# Patient Record
Sex: MALE | Race: BLACK OR AFRICAN AMERICAN | NOT HISPANIC OR LATINO | URBAN - METROPOLITAN AREA
[De-identification: names, ages, dates, MRNs, and addresses within clinical notes are randomized per-mention and may not be internally consistent; named-entity substitution may affect disease eponyms.]

---

## 2023-03-20 PROBLEM — G40.909 SEIZURE DISORDER (MULTI): Status: ACTIVE | Noted: 2023-03-20

## 2023-03-20 PROBLEM — E55.9 VITAMIN D DEFICIENCY, UNSPECIFIED: Status: ACTIVE | Noted: 2023-03-20

## 2023-03-20 PROBLEM — K21.9 GERD (GASTROESOPHAGEAL REFLUX DISEASE): Status: ACTIVE | Noted: 2023-03-20

## 2023-03-20 PROBLEM — I10 HYPERTENSION: Status: ACTIVE | Noted: 2023-03-20

## 2023-03-20 PROBLEM — D64.9 ANEMIA: Status: ACTIVE | Noted: 2023-03-20

## 2023-03-20 PROBLEM — E11.9 DIABETES (MULTI): Status: ACTIVE | Noted: 2023-03-20

## 2023-03-20 PROBLEM — N52.9 ERECTILE DYSFUNCTION: Status: ACTIVE | Noted: 2023-03-20

## 2023-03-20 PROBLEM — H53.8 BLURRED VISION: Status: ACTIVE | Noted: 2023-03-20

## 2023-06-21 DIAGNOSIS — E11.9 TYPE 2 DIABETES MELLITUS WITHOUT COMPLICATIONS (MULTI): ICD-10-CM

## 2023-06-21 RX ORDER — SYRINGE-NEEDLE,INSULIN,0.5 ML 30 G X1/2"
SYRINGE, EMPTY DISPOSABLE MISCELLANEOUS
Qty: 100 EACH | Refills: 11 | Status: SHIPPED | OUTPATIENT
Start: 2023-06-21 | End: 2023-06-22 | Stop reason: SDUPTHER

## 2023-06-21 RX ORDER — FLASH GLUCOSE SENSOR
KIT MISCELLANEOUS
Qty: 1 EACH | Refills: 0 | Status: SHIPPED | OUTPATIENT
Start: 2023-06-21 | End: 2023-06-22 | Stop reason: SDUPTHER

## 2023-06-21 RX ORDER — ERGOCALCIFEROL 1.25 MG/1
1 CAPSULE ORAL
COMMUNITY
Start: 2023-05-24 | End: 2023-09-25 | Stop reason: SDUPTHER

## 2023-06-21 RX ORDER — INSULIN LISPRO 100 [IU]/ML
INJECTION, SOLUTION INTRAVENOUS; SUBCUTANEOUS
Qty: 30 ML | Refills: 2 | Status: SHIPPED | OUTPATIENT
Start: 2023-06-21 | End: 2023-06-22 | Stop reason: ALTCHOICE

## 2023-06-21 RX ORDER — INSULIN GLARGINE 100 [IU]/ML
INJECTION, SOLUTION SUBCUTANEOUS
COMMUNITY
End: 2023-06-21 | Stop reason: SDUPTHER

## 2023-06-21 RX ORDER — FLASH GLUCOSE SCANNING READER
EACH MISCELLANEOUS
COMMUNITY
Start: 2022-09-29

## 2023-06-21 RX ORDER — FLASH GLUCOSE SENSOR
KIT MISCELLANEOUS
COMMUNITY
Start: 2023-05-24 | End: 2023-06-21 | Stop reason: SDUPTHER

## 2023-06-21 RX ORDER — INSULIN ASPART 100 [IU]/ML
INJECTION, SOLUTION INTRAVENOUS; SUBCUTANEOUS
COMMUNITY
Start: 2019-08-14 | End: 2023-06-22 | Stop reason: ALTCHOICE

## 2023-06-21 RX ORDER — INSULIN LISPRO 100 [IU]/ML
8 INJECTION, SOLUTION INTRAVENOUS; SUBCUTANEOUS
COMMUNITY
Start: 2023-03-29 | End: 2023-06-22 | Stop reason: SDUPTHER

## 2023-06-21 RX ORDER — INSULIN GLARGINE 100 [IU]/ML
INJECTION, SOLUTION SUBCUTANEOUS
Qty: 3 ML | Refills: 11 | Status: SHIPPED | OUTPATIENT
Start: 2023-06-21 | End: 2023-06-22 | Stop reason: SDUPTHER

## 2023-06-21 RX ORDER — AMLODIPINE BESYLATE 10 MG/1
10 TABLET ORAL DAILY
COMMUNITY
Start: 2022-10-31 | End: 2023-06-22 | Stop reason: ALTCHOICE

## 2023-06-21 RX ORDER — PEN NEEDLE, DIABETIC 29 G X1/2"
NEEDLE, DISPOSABLE MISCELLANEOUS
COMMUNITY
Start: 2023-05-24 | End: 2023-06-21 | Stop reason: SDUPTHER

## 2023-06-22 ENCOUNTER — OFFICE VISIT (OUTPATIENT)
Dept: PRIMARY CARE | Facility: CLINIC | Age: 38
End: 2023-06-22
Payer: COMMERCIAL

## 2023-06-22 VITALS
SYSTOLIC BLOOD PRESSURE: 118 MMHG | BODY MASS INDEX: 25.18 KG/M2 | RESPIRATION RATE: 12 BRPM | HEART RATE: 101 BPM | WEIGHT: 170 LBS | HEIGHT: 69 IN | OXYGEN SATURATION: 97 % | DIASTOLIC BLOOD PRESSURE: 84 MMHG

## 2023-06-22 DIAGNOSIS — E11.69 TYPE 2 DIABETES MELLITUS WITH OTHER SPECIFIED COMPLICATION, UNSPECIFIED WHETHER LONG TERM INSULIN USE (MULTI): ICD-10-CM

## 2023-06-22 DIAGNOSIS — E55.9 VITAMIN D DEFICIENCY, UNSPECIFIED: ICD-10-CM

## 2023-06-22 DIAGNOSIS — E11.9 TYPE 2 DIABETES MELLITUS WITHOUT COMPLICATIONS (MULTI): ICD-10-CM

## 2023-06-22 DIAGNOSIS — Z00.00 ANNUAL PHYSICAL EXAM: ICD-10-CM

## 2023-06-22 DIAGNOSIS — I10 HYPERTENSION, UNSPECIFIED TYPE: Primary | ICD-10-CM

## 2023-06-22 PROBLEM — E10.9 TYPE 1 DIABETES MELLITUS WITHOUT COMPLICATIONS (MULTI): Chronic | Status: ACTIVE | Noted: 2019-09-04

## 2023-06-22 PROBLEM — E83.42 HYPOMAGNESEMIA: Status: ACTIVE | Noted: 2021-08-21

## 2023-06-22 PROBLEM — E83.39 HYPOPHOSPHATEMIA: Status: ACTIVE | Noted: 2021-08-21

## 2023-06-22 LAB — POC FINGERSTICK BLOOD GLUCOSE: 118 MG/DL (ref 70–100)

## 2023-06-22 PROCEDURE — 3079F DIAST BP 80-89 MM HG: CPT | Performed by: INTERNAL MEDICINE

## 2023-06-22 PROCEDURE — 3074F SYST BP LT 130 MM HG: CPT | Performed by: INTERNAL MEDICINE

## 2023-06-22 PROCEDURE — 1036F TOBACCO NON-USER: CPT | Performed by: INTERNAL MEDICINE

## 2023-06-22 PROCEDURE — 82962 GLUCOSE BLOOD TEST: CPT | Performed by: INTERNAL MEDICINE

## 2023-06-22 PROCEDURE — 99214 OFFICE O/P EST MOD 30 MIN: CPT | Performed by: INTERNAL MEDICINE

## 2023-06-22 RX ORDER — FLASH GLUCOSE SENSOR
KIT MISCELLANEOUS
Qty: 2 EACH | Refills: 11 | Status: SHIPPED | OUTPATIENT
Start: 2023-06-22 | End: 2023-12-07 | Stop reason: SDUPTHER

## 2023-06-22 RX ORDER — PEN NEEDLE, DIABETIC 30 GX3/16"
NEEDLE, DISPOSABLE MISCELLANEOUS
Qty: 100 EACH | Refills: 2 | Status: SHIPPED | OUTPATIENT
Start: 2023-06-22 | End: 2023-09-26 | Stop reason: SDUPTHER

## 2023-06-22 RX ORDER — SYRINGE-NEEDLE,INSULIN,0.5 ML 30 G X1/2"
SYRINGE, EMPTY DISPOSABLE MISCELLANEOUS
Qty: 100 EACH | Refills: 11 | Status: SHIPPED | OUTPATIENT
Start: 2023-06-22 | End: 2023-09-26 | Stop reason: SDUPTHER

## 2023-06-22 RX ORDER — INSULIN GLARGINE 100 [IU]/ML
12 INJECTION, SOLUTION SUBCUTANEOUS 2 TIMES DAILY
Qty: 5 EACH | Refills: 3 | Status: SHIPPED | OUTPATIENT
Start: 2023-06-22 | End: 2023-09-25 | Stop reason: SDUPTHER

## 2023-06-22 RX ORDER — ISOPROPYL ALCOHOL 70 ML/100ML
1 SWAB TOPICAL DAILY PRN
Qty: 90 EACH | Refills: 3 | Status: SHIPPED | OUTPATIENT
Start: 2023-06-22 | End: 2024-06-21

## 2023-06-22 RX ORDER — INSULIN LISPRO 100 [IU]/ML
8 INJECTION, SOLUTION INTRAVENOUS; SUBCUTANEOUS
Qty: 5 EACH | Refills: 3 | Status: SHIPPED | OUTPATIENT
Start: 2023-06-22 | End: 2023-09-25 | Stop reason: SDUPTHER

## 2023-06-22 NOTE — PROGRESS NOTES
"Subjective   Chief complaint: Armand Hirsch is a 37 y.o. male who presents for Med Refill (Pt is here for medication refills. ).    HPI:  Armand is here today for medication refills. No other concerns at this time.     Med Refill  This is a chronic problem. The current episode started more than 1 year ago. The problem occurs constantly. The problem has been unchanged. Nothing aggravates the symptoms.       Objective   /84   Pulse 101   Resp 12   Ht 1.753 m (5' 9\")   Wt 77.1 kg (170 lb)   SpO2 97%   BMI 25.10 kg/m²   Physical Exam  Vitals reviewed.   Constitutional:       Appearance: Normal appearance.   HENT:      Head: Normocephalic and atraumatic.   Cardiovascular:      Rate and Rhythm: Normal rate and regular rhythm.   Pulmonary:      Effort: Pulmonary effort is normal.      Breath sounds: Normal breath sounds.   Abdominal:      General: Bowel sounds are normal.      Palpations: Abdomen is soft.   Musculoskeletal:      Cervical back: Neck supple.   Skin:     General: Skin is warm and dry.   Neurological:      General: No focal deficit present.      Mental Status: He is alert.   Psychiatric:         Mood and Affect: Mood normal.         Behavior: Behavior is cooperative.         I have reviewed and reconciled the medication list with the patient today.   Current Outpatient Medications:     BD Ultra-Fine Mini Pen Needle 31 gauge x 3/16\" needle, USE AS DIRECTED WITH INSULIN INJECTIONS 4 TIMES PER DAY, Disp: 100 each, Rfl: 2    ergocalciferol (Vitamin D-2) 1.25 MG (93183 UT) capsule, Take 1 capsule (1,250 mcg) by mouth 1 (one) time per week., Disp: , Rfl:     FreeStyle Dennise 2 [place] misc, , Disp: , Rfl:     FreeStyle Dennise 2 Sensor kit, USE AS DIRECTED, Disp: 1 each, Rfl: 0    insulin glargine (Lantus Solostar U-100 Insulin) 100 unit/mL (3 mL) pen, INJECT 25 UNITS SUBCUTANEOUSLY EVERY MORNING. (Patient taking differently: Inject 12 Units under the skin 2 times a day. INJECT 25 UNITS SUBCUTANEOUSLY " "EVERY MORNING.), Disp: 3 mL, Rfl: 11    insulin lispro (HumaLOG) 100 unit/mL injection, Inject 8 Units under the skin 3 times a day with meals., Disp: , Rfl:     insulin syringe-needle U-100 (BD Insulin Syringe Ultra-Fine) 30G X 1/2\" 0.5 mL syringe, USE AS DIRECTED 4 TIMES DAILY, Disp: 100 each, Rfl: 11     Imaging:  No results found.     Labs reviewed:    Lab Results   Component Value Date    WBC 5.9 01/24/2023    HGB 13.6 01/24/2023    HCT 42.2 01/24/2023     01/24/2023    CHOL 141 02/21/2018    TRIG 66 02/21/2018    HDL 48.7 02/21/2018    ALT 23 11/28/2022    AST 23 11/28/2022     02/08/2023    K 4.4 02/08/2023    CL 99 02/08/2023    CREATININE 1.22 02/08/2023    BUN 16 02/08/2023    CO2 27 02/08/2023    TSH 0.90 01/07/2019    INR 0.8 (L) 01/24/2023    HGBA1C 12.1 (A) 11/28/2022       Assessment/Plan   Problem List Items Addressed This Visit          Circulatory    Hypertension - Primary     WNL (118/84)            Endocrine/Metabolic    Diabetes (CMS/[facility])     Insulin refilled today         Relevant Orders    POCT fingerstick glucose manually resulted (Completed)     Other Visit Diagnoses       Type 2 diabetes mellitus without complications (CMS/[facility])                Continue current medications as listed  Follow up in 1-2 months for CPx       "

## 2023-07-06 ENCOUNTER — APPOINTMENT (OUTPATIENT)
Dept: PRIMARY CARE | Facility: CLINIC | Age: 38
End: 2023-07-06
Payer: COMMERCIAL

## 2023-08-01 DIAGNOSIS — E55.9 VITAMIN D DEFICIENCY, UNSPECIFIED: ICD-10-CM

## 2023-08-01 DIAGNOSIS — Z00.00 ENCOUNTER FOR ANNUAL PHYSICAL EXAM: ICD-10-CM

## 2023-08-01 DIAGNOSIS — E11.9 TYPE 2 DIABETES MELLITUS WITHOUT COMPLICATION, WITHOUT LONG-TERM CURRENT USE OF INSULIN (MULTI): ICD-10-CM

## 2023-08-05 PROBLEM — R73.9 HYPERGLYCEMIA: Status: ACTIVE | Noted: 2021-11-14

## 2023-08-05 PROBLEM — E11.10 DIABETIC KETOACIDOSIS (MULTI): Status: ACTIVE | Noted: 2021-02-20

## 2023-08-05 PROBLEM — H54.7 VISION LOSS: Status: ACTIVE | Noted: 2022-10-21

## 2023-08-05 PROBLEM — N17.9 AKI (ACUTE KIDNEY INJURY) (CMS-HCC): Status: ACTIVE | Noted: 2021-09-03

## 2023-08-05 PROBLEM — E44.1 MILD PROTEIN-CALORIE MALNUTRITION (MULTI): Status: ACTIVE | Noted: 2021-02-20

## 2023-08-05 PROBLEM — E16.2 HYPOGLYCEMIA: Status: ACTIVE | Noted: 2021-11-16

## 2023-08-05 RX ORDER — AMLODIPINE BESYLATE 10 MG/1
10 TABLET ORAL DAILY
COMMUNITY
End: 2023-09-26 | Stop reason: SDUPTHER

## 2023-08-08 ENCOUNTER — LAB (OUTPATIENT)
Dept: LAB | Facility: LAB | Age: 38
End: 2023-08-08
Payer: COMMERCIAL

## 2023-08-08 DIAGNOSIS — E11.9 TYPE 2 DIABETES MELLITUS WITHOUT COMPLICATION, WITHOUT LONG-TERM CURRENT USE OF INSULIN (MULTI): ICD-10-CM

## 2023-08-08 DIAGNOSIS — Z00.00 ENCOUNTER FOR ANNUAL PHYSICAL EXAM: ICD-10-CM

## 2023-08-08 DIAGNOSIS — E55.9 VITAMIN D DEFICIENCY, UNSPECIFIED: ICD-10-CM

## 2023-08-08 LAB
ERYTHROCYTE DISTRIBUTION WIDTH (RATIO) BY AUTOMATED COUNT: 13.2 % (ref 11.5–14.5)
ERYTHROCYTE MEAN CORPUSCULAR HEMOGLOBIN CONCENTRATION (G/DL) BY AUTOMATED: 31.4 G/DL (ref 32–36)
ERYTHROCYTE MEAN CORPUSCULAR VOLUME (FL) BY AUTOMATED COUNT: 94 FL (ref 80–100)
ERYTHROCYTES (10*6/UL) IN BLOOD BY AUTOMATED COUNT: 4.37 X10E12/L (ref 4.5–5.9)
HEMATOCRIT (%) IN BLOOD BY AUTOMATED COUNT: 41.1 % (ref 41–52)
HEMOGLOBIN (G/DL) IN BLOOD: 12.9 G/DL (ref 13.5–17.5)
LEUKOCYTES (10*3/UL) IN BLOOD BY AUTOMATED COUNT: 6.8 X10E9/L (ref 4.4–11.3)
NRBC (PER 100 WBCS) BY AUTOMATED COUNT: 0 /100 WBC (ref 0–0)
PLATELETS (10*3/UL) IN BLOOD AUTOMATED COUNT: 316 X10E9/L (ref 150–450)

## 2023-08-08 PROCEDURE — 80061 LIPID PANEL: CPT

## 2023-08-08 PROCEDURE — 82306 VITAMIN D 25 HYDROXY: CPT

## 2023-08-08 PROCEDURE — 85027 COMPLETE CBC AUTOMATED: CPT

## 2023-08-08 PROCEDURE — 36415 COLL VENOUS BLD VENIPUNCTURE: CPT

## 2023-08-08 PROCEDURE — 80053 COMPREHEN METABOLIC PANEL: CPT

## 2023-08-08 PROCEDURE — 84443 ASSAY THYROID STIM HORMONE: CPT

## 2023-08-08 PROCEDURE — 83036 HEMOGLOBIN GLYCOSYLATED A1C: CPT

## 2023-08-09 LAB
ALANINE AMINOTRANSFERASE (SGPT) (U/L) IN SER/PLAS: 15 U/L (ref 10–52)
ALBUMIN (G/DL) IN SER/PLAS: 4.2 G/DL (ref 3.4–5)
ALKALINE PHOSPHATASE (U/L) IN SER/PLAS: 62 U/L (ref 33–120)
ANION GAP IN SER/PLAS: 19 MMOL/L (ref 10–20)
ASPARTATE AMINOTRANSFERASE (SGOT) (U/L) IN SER/PLAS: 16 U/L (ref 9–39)
BILIRUBIN TOTAL (MG/DL) IN SER/PLAS: 0.5 MG/DL (ref 0–1.2)
CALCIDIOL (25 OH VITAMIN D3) (NG/ML) IN SER/PLAS: 18 NG/ML
CALCIUM (MG/DL) IN SER/PLAS: 9.8 MG/DL (ref 8.6–10.6)
CARBON DIOXIDE, TOTAL (MMOL/L) IN SER/PLAS: 21 MMOL/L (ref 21–32)
CHLORIDE (MMOL/L) IN SER/PLAS: 100 MMOL/L (ref 98–107)
CHOLESTEROL (MG/DL) IN SER/PLAS: 242 MG/DL (ref 0–199)
CHOLESTEROL IN HDL (MG/DL) IN SER/PLAS: 65.3 MG/DL
CHOLESTEROL/HDL RATIO: 3.7
CREATININE (MG/DL) IN SER/PLAS: 1.12 MG/DL (ref 0.5–1.3)
ESTIMATED AVERAGE GLUCOSE FOR HBA1C: 283 MG/DL
GFR MALE: 85 ML/MIN/1.73M2
GLUCOSE (MG/DL) IN SER/PLAS: 229 MG/DL (ref 74–99)
HEMOGLOBIN A1C/HEMOGLOBIN TOTAL IN BLOOD: 11.5 %
LDL: 142 MG/DL (ref 0–99)
POTASSIUM (MMOL/L) IN SER/PLAS: 3.9 MMOL/L (ref 3.5–5.3)
PROTEIN TOTAL: 7 G/DL (ref 6.4–8.2)
SODIUM (MMOL/L) IN SER/PLAS: 136 MMOL/L (ref 136–145)
THYROTROPIN (MIU/L) IN SER/PLAS BY DETECTION LIMIT <= 0.05 MIU/L: 0.5 MIU/L (ref 0.44–3.98)
TRIGLYCERIDE (MG/DL) IN SER/PLAS: 176 MG/DL (ref 0–149)
UREA NITROGEN (MG/DL) IN SER/PLAS: 10 MG/DL (ref 6–23)
VLDL: 35 MG/DL (ref 0–40)

## 2023-09-25 ENCOUNTER — OFFICE VISIT (OUTPATIENT)
Dept: PRIMARY CARE | Facility: CLINIC | Age: 38
End: 2023-09-25
Payer: COMMERCIAL

## 2023-09-25 VITALS
OXYGEN SATURATION: 98 % | WEIGHT: 162 LBS | DIASTOLIC BLOOD PRESSURE: 82 MMHG | SYSTOLIC BLOOD PRESSURE: 118 MMHG | HEART RATE: 104 BPM | BODY MASS INDEX: 23.92 KG/M2 | RESPIRATION RATE: 12 BRPM

## 2023-09-25 DIAGNOSIS — I10 HYPERTENSION, UNSPECIFIED TYPE: Primary | ICD-10-CM

## 2023-09-25 DIAGNOSIS — E11.69 TYPE 2 DIABETES MELLITUS WITH OTHER SPECIFIED COMPLICATION, UNSPECIFIED WHETHER LONG TERM INSULIN USE (MULTI): ICD-10-CM

## 2023-09-25 DIAGNOSIS — N52.9 ERECTILE DYSFUNCTION, UNSPECIFIED ERECTILE DYSFUNCTION TYPE: ICD-10-CM

## 2023-09-25 DIAGNOSIS — Z00.00 PHYSICAL EXAM: ICD-10-CM

## 2023-09-25 DIAGNOSIS — S02.92XS: ICD-10-CM

## 2023-09-25 DIAGNOSIS — E78.5 HYPERLIPIDEMIA, UNSPECIFIED HYPERLIPIDEMIA TYPE: ICD-10-CM

## 2023-09-25 DIAGNOSIS — E11.9 TYPE 2 DIABETES MELLITUS WITHOUT COMPLICATIONS (MULTI): ICD-10-CM

## 2023-09-25 DIAGNOSIS — E55.9 VITAMIN D DEFICIENCY, UNSPECIFIED: ICD-10-CM

## 2023-09-25 DIAGNOSIS — E13.69 OTHER SPECIFIED DIABETES MELLITUS WITH OTHER SPECIFIED COMPLICATION, UNSPECIFIED WHETHER LONG TERM INSULIN USE (MULTI): ICD-10-CM

## 2023-09-25 PROBLEM — S02.92XA FACIAL FRACTURE (MULTI): Status: ACTIVE | Noted: 2019-08-27

## 2023-09-25 LAB
POC APPEARANCE, URINE: CLEAR
POC BILIRUBIN, URINE: NEGATIVE
POC BLOOD, URINE: NEGATIVE
POC COLOR, URINE: YELLOW
POC FINGERSTICK BLOOD GLUCOSE: 562 MG/DL (ref 70–100)
POC GLUCOSE, URINE: ABNORMAL MG/DL
POC KETONES, URINE: ABNORMAL MG/DL
POC LEUKOCYTES, URINE: NEGATIVE
POC NITRITE,URINE: NEGATIVE
POC OCCULT BLOOD STOOL #1: NEGATIVE
POC OCCULT BLOOD STOOL #2: NEGATIVE
POC OCCULT BLOOD STOOL #3: NEGATIVE
POC PH, URINE: 6 PH
POC PROTEIN, URINE: NEGATIVE MG/DL
POC SPECIFIC GRAVITY, URINE: 1.01
POC UROBILINOGEN, URINE: 0.2 EU/DL

## 2023-09-25 PROCEDURE — 1036F TOBACCO NON-USER: CPT | Performed by: INTERNAL MEDICINE

## 2023-09-25 PROCEDURE — 99214 OFFICE O/P EST MOD 30 MIN: CPT | Performed by: INTERNAL MEDICINE

## 2023-09-25 PROCEDURE — 82270 OCCULT BLOOD FECES: CPT | Performed by: INTERNAL MEDICINE

## 2023-09-25 PROCEDURE — 3046F HEMOGLOBIN A1C LEVEL >9.0%: CPT | Performed by: INTERNAL MEDICINE

## 2023-09-25 PROCEDURE — 82962 GLUCOSE BLOOD TEST: CPT | Performed by: INTERNAL MEDICINE

## 2023-09-25 PROCEDURE — 81002 URINALYSIS NONAUTO W/O SCOPE: CPT | Performed by: INTERNAL MEDICINE

## 2023-09-25 PROCEDURE — 3079F DIAST BP 80-89 MM HG: CPT | Performed by: INTERNAL MEDICINE

## 2023-09-25 PROCEDURE — 99395 PREV VISIT EST AGE 18-39: CPT | Performed by: INTERNAL MEDICINE

## 2023-09-25 PROCEDURE — 93000 ELECTROCARDIOGRAM COMPLETE: CPT | Performed by: INTERNAL MEDICINE

## 2023-09-25 PROCEDURE — 3074F SYST BP LT 130 MM HG: CPT | Performed by: INTERNAL MEDICINE

## 2023-09-25 RX ORDER — INSULIN GLARGINE 100 [IU]/ML
18 INJECTION, SOLUTION SUBCUTANEOUS 2 TIMES DAILY
Qty: 5 EACH | Refills: 3 | Status: SHIPPED | OUTPATIENT
Start: 2023-09-25 | End: 2023-09-26 | Stop reason: SDUPTHER

## 2023-09-25 RX ORDER — INSULIN LISPRO 100 [IU]/ML
8 INJECTION, SOLUTION INTRAVENOUS; SUBCUTANEOUS
Qty: 5 EACH | Refills: 3 | Status: SHIPPED | OUTPATIENT
Start: 2023-09-25 | End: 2023-09-26 | Stop reason: SDUPTHER

## 2023-09-25 RX ORDER — SILDENAFIL 50 MG/1
50 TABLET, FILM COATED ORAL DAILY PRN
Qty: 12 TABLET | Refills: 11 | Status: SHIPPED | OUTPATIENT
Start: 2023-09-25 | End: 2023-09-26 | Stop reason: SDUPTHER

## 2023-09-25 RX ORDER — TIZANIDINE 4 MG/1
4 TABLET ORAL NIGHTLY
Qty: 30 TABLET | Refills: 11 | Status: SHIPPED | OUTPATIENT
Start: 2023-09-25 | End: 2023-10-18

## 2023-09-25 RX ORDER — ERGOCALCIFEROL 1.25 MG/1
1 CAPSULE ORAL
Qty: 12 CAPSULE | Refills: 3 | Status: SHIPPED | OUTPATIENT
Start: 2023-09-25

## 2023-09-25 RX ORDER — ATORVASTATIN CALCIUM 20 MG/1
20 TABLET, FILM COATED ORAL DAILY
Qty: 90 TABLET | Refills: 3 | Status: SHIPPED | OUTPATIENT
Start: 2023-09-25

## 2023-09-25 ASSESSMENT — PAIN SCALES - GENERAL: PAINLEVEL: 4

## 2023-09-25 NOTE — PROGRESS NOTES
Subjective   Chief complaint: Armand Hirsch is a 37 y.o. male who presents for Annual Exam (Pt. Is here today for his annual exam.).    HPI:  HPI  Patient is here for his annual wellness visit regarding diabetes.    Objective   /82   Pulse 104   Resp 12   Wt 73.5 kg (162 lb)   SpO2 98%   BMI 23.92 kg/m²   Physical Exam  Vitals reviewed.   Constitutional:       Appearance: Normal appearance.   HENT:      Head: Normocephalic.      Mouth/Throat:      Mouth: Mucous membranes are moist.      Pharynx: Oropharynx is clear.   Eyes:      Extraocular Movements: Extraocular movements intact.   Cardiovascular:      Rate and Rhythm: Normal rate.      Pulses: Normal pulses.   Pulmonary:      Effort: Pulmonary effort is normal.      Breath sounds: Normal breath sounds.   Abdominal:      General: Abdomen is flat. Bowel sounds are normal. There is no distension.      Palpations: Abdomen is soft. There is no mass.   Musculoskeletal:      Cervical back: Neck supple.   Skin:     General: Skin is warm and dry.      Capillary Refill: Capillary refill takes less than 2 seconds.   Neurological:      Mental Status: He is oriented to person, place, and time.   Psychiatric:         Mood and Affect: Mood normal.         Behavior: Behavior normal.         I have reviewed and reconciled the medication list with the patient today.   Current Outpatient Medications:     alcohol swabs pads, medicated, Apply 1 each topically once daily as needed (Use when injecting Insulin and testing blood sugar)., Disp: 90 each, Rfl: 3    amLODIPine (Norvasc) 10 mg tablet, Take 1 tablet (10 mg) by mouth once daily., Disp: , Rfl:     ergocalciferol (Vitamin D-2) 1.25 MG (82457 UT) capsule, Take 1 capsule (1,250 mcg) by mouth 1 (one) time per week., Disp: , Rfl:     FreeStyle Dennise 2 [place] misc, , Disp: , Rfl:     FreeStyle Dennise sensor system (FreeStyle Dennise 2 Sensor) kit, USE AS DIRECTED, Disp: 2 each, Rfl: 11    insulin glargine (Lantus Solostar  "U-100 Insulin) 100 unit/mL (3 mL) pen, Inject 12 Units under the skin 2 times a day. INJECT 25 UNITS SUBCUTANEOUSLY EVERY MORNING., Disp: 5 each, Rfl: 3    insulin lispro (HumaLOG) 100 unit/mL injection, Inject 8 Units under the skin 3 times a day with meals., Disp: 5 each, Rfl: 3    insulin syringe-needle U-100 (BD Insulin Syringe Ultra-Fine) 30G X 1/2\" 0.5 mL syringe, USE AS DIRECTED 4 TIMES DAILY, Disp: 100 each, Rfl: 11    pen needle, diabetic (BD Ultra-Fine Mini Pen Needle) 31 gauge x 3/16\" needle, Inject 3 times daily, Disp: 100 each, Rfl: 2     Imaging:  No results found.     Labs reviewed:    Lab Results   Component Value Date    WBC 6.8 08/08/2023    HGB 12.9 (L) 08/08/2023    HCT 41.1 08/08/2023     08/08/2023    CHOL 242 (H) 08/08/2023    TRIG 176 (H) 08/08/2023    HDL 65.3 08/08/2023    ALT 15 08/08/2023    AST 16 08/08/2023     08/08/2023    K 3.9 08/08/2023     08/08/2023    CREATININE 1.12 08/08/2023    BUN 10 08/08/2023    CO2 21 08/08/2023    TSH 0.50 08/08/2023    INR 0.8 (L) 01/24/2023    HGBA1C 11.5 (A) 08/08/2023       Assessment/Plan   Problem List Items Addressed This Visit       Diabetes (CMS/[facility])     Labs reviewed with patient, Hgb A1c is 11.5%. He says his fasting blood sugar is usually in the 200s. Currenlty on Humalog 6 units +sliding scale TID, and Lantus 14units BID.     Changing insulin regimen to: Humalog 8units with each meal TID, and Lantus 18units twice a day.          Relevant Orders    POCT fingerstick glucose manually resulted (Completed)    Erectile dysfunction     Will send in Rx for Sildenafil 50mg to take 1 hour prior to sexual intercourse.         Hypertension - Primary     BP is well controlled 118/82. Continue amlodipine 10mg         Vitamin D deficiency, unspecified     Labs reviewed with patient, vitamin D is low at 18. Will refill vitamin D weekly supplements.         Facial fracture (CMS/[facility])     Other Visit Diagnoses       Physical exam        " Relevant Orders    ECG 12 Lead    POCT occult blood stool manually resulted (Completed)    POCT UA (nonautomated) manually resulted (Completed)    Hyperlipidemia, unspecified hyperlipidemia type        Type 2 diabetes mellitus without complications (CMS/HCC)                Continue current medications as listed  Follow up in 3 months

## 2023-09-25 NOTE — ASSESSMENT & PLAN NOTE
Labs reviewed with patient, Hgb A1c is 11.5%. He says his fasting blood sugar is usually in the 200s. Currenlty on Humalog 6 units +sliding scale TID, and Lantus 14units BID.     Changing insulin regimen to: Humalog 8units with each meal TID, and Lantus 18units twice a day.

## 2023-09-26 DIAGNOSIS — E11.9 TYPE 2 DIABETES MELLITUS WITHOUT COMPLICATIONS (MULTI): ICD-10-CM

## 2023-09-26 DIAGNOSIS — N52.9 ERECTILE DYSFUNCTION, UNSPECIFIED ERECTILE DYSFUNCTION TYPE: ICD-10-CM

## 2023-09-26 DIAGNOSIS — I10 HYPERTENSION, UNSPECIFIED TYPE: ICD-10-CM

## 2023-09-26 DIAGNOSIS — E11.69 TYPE 2 DIABETES MELLITUS WITH OTHER SPECIFIED COMPLICATION, UNSPECIFIED WHETHER LONG TERM INSULIN USE (MULTI): ICD-10-CM

## 2023-09-26 DIAGNOSIS — R25.2 LEG CRAMPS: ICD-10-CM

## 2023-09-26 RX ORDER — INSULIN GLARGINE 100 [IU]/ML
18 INJECTION, SOLUTION SUBCUTANEOUS 2 TIMES DAILY
Qty: 5 EACH | Refills: 3 | Status: SHIPPED | OUTPATIENT
Start: 2023-09-26 | End: 2023-09-26 | Stop reason: SDUPTHER

## 2023-09-26 RX ORDER — SYRINGE-NEEDLE,INSULIN,0.5 ML 30 G X1/2"
SYRINGE, EMPTY DISPOSABLE MISCELLANEOUS
Qty: 100 EACH | Refills: 11 | Status: SHIPPED | OUTPATIENT
Start: 2023-09-26 | End: 2024-03-11 | Stop reason: SDUPTHER

## 2023-09-26 RX ORDER — VITS A,C,E/LUTEIN/MINERALS 300MCG-200
800 TABLET ORAL DAILY
Qty: 90 TABLET | Refills: 3 | Status: SHIPPED | OUTPATIENT
Start: 2023-09-26 | End: 2024-05-14

## 2023-09-26 RX ORDER — AMLODIPINE BESYLATE 10 MG/1
10 TABLET ORAL DAILY
Qty: 90 TABLET | Refills: 3 | Status: SHIPPED | OUTPATIENT
Start: 2023-09-26

## 2023-09-26 RX ORDER — PEN NEEDLE, DIABETIC 30 GX3/16"
NEEDLE, DISPOSABLE MISCELLANEOUS
Qty: 100 EACH | Refills: 2 | Status: SHIPPED | OUTPATIENT
Start: 2023-09-26 | End: 2023-12-29

## 2023-09-26 RX ORDER — SILDENAFIL 50 MG/1
50 TABLET, FILM COATED ORAL DAILY PRN
Qty: 12 TABLET | Refills: 11 | Status: SHIPPED | OUTPATIENT
Start: 2023-09-26

## 2023-09-26 RX ORDER — INSULIN LISPRO 100 [IU]/ML
8 INJECTION, SOLUTION INTRAVENOUS; SUBCUTANEOUS
Qty: 5 EACH | Refills: 3 | Status: SHIPPED | OUTPATIENT
Start: 2023-09-26 | End: 2023-12-11 | Stop reason: SDUPTHER

## 2023-09-27 RX ORDER — INSULIN GLARGINE 100 [IU]/ML
18 INJECTION, SOLUTION SUBCUTANEOUS 2 TIMES DAILY
Qty: 5 EACH | Refills: 3 | Status: SHIPPED | OUTPATIENT
Start: 2023-09-27 | End: 2024-01-31 | Stop reason: SDUPTHER

## 2023-10-18 DIAGNOSIS — S02.92XS: ICD-10-CM

## 2023-10-18 RX ORDER — TIZANIDINE 4 MG/1
4 TABLET ORAL NIGHTLY
Qty: 90 TABLET | Refills: 4 | Status: SHIPPED | OUTPATIENT
Start: 2023-10-18 | End: 2023-12-29 | Stop reason: ALTCHOICE

## 2023-10-19 ENCOUNTER — OFFICE VISIT (OUTPATIENT)
Dept: PRIMARY CARE | Facility: CLINIC | Age: 38
End: 2023-10-19
Payer: COMMERCIAL

## 2023-10-19 VITALS
SYSTOLIC BLOOD PRESSURE: 146 MMHG | DIASTOLIC BLOOD PRESSURE: 87 MMHG | BODY MASS INDEX: 24.81 KG/M2 | HEART RATE: 119 BPM | OXYGEN SATURATION: 97 % | WEIGHT: 168 LBS | RESPIRATION RATE: 12 BRPM

## 2023-10-19 DIAGNOSIS — E11.69 TYPE 2 DIABETES MELLITUS WITH OTHER SPECIFIED COMPLICATION, UNSPECIFIED WHETHER LONG TERM INSULIN USE (MULTI): Primary | ICD-10-CM

## 2023-10-19 DIAGNOSIS — Z23 FLU VACCINE NEED: ICD-10-CM

## 2023-10-19 DIAGNOSIS — E11.41 DIABETIC MONONEUROPATHY ASSOCIATED WITH TYPE 2 DIABETES MELLITUS (MULTI): ICD-10-CM

## 2023-10-19 LAB — POC FINGERSTICK BLOOD GLUCOSE: 174 MG/DL (ref 70–100)

## 2023-10-19 PROCEDURE — 3046F HEMOGLOBIN A1C LEVEL >9.0%: CPT | Performed by: INTERNAL MEDICINE

## 2023-10-19 PROCEDURE — 90686 IIV4 VACC NO PRSV 0.5 ML IM: CPT | Performed by: INTERNAL MEDICINE

## 2023-10-19 PROCEDURE — 82962 GLUCOSE BLOOD TEST: CPT | Performed by: INTERNAL MEDICINE

## 2023-10-19 PROCEDURE — 1036F TOBACCO NON-USER: CPT | Performed by: INTERNAL MEDICINE

## 2023-10-19 PROCEDURE — 90471 IMMUNIZATION ADMIN: CPT | Performed by: INTERNAL MEDICINE

## 2023-10-19 PROCEDURE — 3077F SYST BP >= 140 MM HG: CPT | Performed by: INTERNAL MEDICINE

## 2023-10-19 PROCEDURE — 99214 OFFICE O/P EST MOD 30 MIN: CPT | Performed by: INTERNAL MEDICINE

## 2023-10-19 PROCEDURE — 3079F DIAST BP 80-89 MM HG: CPT | Performed by: INTERNAL MEDICINE

## 2023-10-19 RX ORDER — GABAPENTIN 100 MG/1
100 CAPSULE ORAL 3 TIMES DAILY
Qty: 90 CAPSULE | Refills: 0 | Status: SHIPPED | OUTPATIENT
Start: 2023-10-19 | End: 2023-10-24

## 2023-10-19 NOTE — PROGRESS NOTES
Subjective   Chief complaint: Armand Hirsch is a 37 y.o. male who presents for Pain (Pt c/o pain in both feet mainly at night when laying down. ).    HPI:  Pt presents for bilateral foot pain onset 2 months ago following a hospitalization for DKA. Pt was seen few weeks ago here for same complaint and was prescribed tizanidine. He notes no alleviation to his symptoms.        Objective   /87   Pulse (!) 119   Resp 12   Wt 76.2 kg (168 lb)   SpO2 97%   BMI 24.81 kg/m²   Physical Exam  Vitals reviewed.   Constitutional:       Appearance: Normal appearance.   Cardiovascular:      Rate and Rhythm: Normal rate and regular rhythm.      Pulses: Normal pulses.      Heart sounds: Normal heart sounds.   Pulmonary:      Effort: Pulmonary effort is normal.      Breath sounds: Normal breath sounds.   Musculoskeletal:         General: No swelling, tenderness, deformity or signs of injury. Normal range of motion.   Neurological:      Mental Status: He is alert and oriented to person, place, and time.   Psychiatric:         Mood and Affect: Mood normal.         I have reviewed and reconciled the medication list with the patient today.   Current Outpatient Medications:     alcohol swabs pads, medicated, Apply 1 each topically once daily as needed (Use when injecting Insulin and testing blood sugar)., Disp: 90 each, Rfl: 3    amLODIPine (Norvasc) 10 mg tablet, Take 1 tablet (10 mg) by mouth once daily., Disp: 90 tablet, Rfl: 3    atorvastatin (Lipitor) 20 mg tablet, Take 1 tablet (20 mg) by mouth once daily., Disp: 90 tablet, Rfl: 3    ergocalciferol (Vitamin D-2) 1.25 MG (08544 UT) capsule, Take 1 capsule (1,250 mcg) by mouth 1 (one) time per week., Disp: 12 capsule, Rfl: 3    FreeStyle Dennise 2 [place] misc, , Disp: , Rfl:     FreeStyle Dennise sensor system (FreeStyle Dennise 2 Sensor) kit, USE AS DIRECTED, Disp: 2 each, Rfl: 11    insulin glargine (Lantus Solostar U-100 Insulin) 100 unit/mL (3 mL) pen, Inject 18 Units under  "the skin 2 times a day. INJECT 25 UNITS SUBCUTANEOUSLY EVERY MORNING. (Patient taking differently: Inject 18 Units under the skin 2 times a day. Inject 18 units twice daily), Disp: 5 each, Rfl: 3    insulin lispro (HumaLOG) 100 unit/mL injection, Inject 8 Units under the skin 3 times a day with meals., Disp: 5 each, Rfl: 3    insulin syringe-needle U-100 (BD Insulin Syringe Ultra-Fine) 30G X 1/2\" 0.5 mL syringe, USE AS DIRECTED 4 TIMES DAILY, Disp: 100 each, Rfl: 11    magnesium oxide (Mag-Ox) 200 mg magnesium tablet, Take 4 tablets (800 mg) by mouth once daily., Disp: 90 tablet, Rfl: 3    pen needle, diabetic (BD Ultra-Fine Mini Pen Needle) 31 gauge x 3/16\" needle, Inject 3 times daily, Disp: 100 each, Rfl: 2    sildenafil (Viagra) 50 mg tablet, Take 1 tablet (50 mg) by mouth once daily as needed for erectile dysfunction., Disp: 12 tablet, Rfl: 11    tiZANidine (Zanaflex) 4 mg tablet, TAKE 1 TABLET (4 MG) BY MOUTH ONCE DAILY AT BEDTIME., Disp: 90 tablet, Rfl: 4     Imaging:  ECG 12 Lead    Result Date: 9/25/2023  Nonspecific ST Tchanges       Labs reviewed:    Lab Results   Component Value Date    WBC 6.8 08/08/2023    HGB 12.9 (L) 08/08/2023    HCT 41.1 08/08/2023     08/08/2023    CHOL 242 (H) 08/08/2023    TRIG 176 (H) 08/08/2023    HDL 65.3 08/08/2023    ALT 15 08/08/2023    AST 16 08/08/2023     08/08/2023    K 3.9 08/08/2023     08/08/2023    CREATININE 1.12 08/08/2023    BUN 10 08/08/2023    CO2 21 08/08/2023    TSH 0.50 08/08/2023    INR 0.8 (L) 01/24/2023    HGBA1C 11.5 (A) 08/08/2023       Assessment/Plan   Problem List Items Addressed This Visit       Diabetes (CMS/[facility])    Relevant Orders    POCT fingerstick glucose manually resulted (Completed)     Other Visit Diagnoses       Flu vaccine need        Relevant Orders    Flu vaccine (IIV4) age 6 months and greater, preservative free (Completed)            Continue current medications as listed  Follow up in 4 wks reassessment of foot " pain after taking gabapentin.

## 2023-10-21 DIAGNOSIS — E11.41 DIABETIC MONONEUROPATHY ASSOCIATED WITH TYPE 2 DIABETES MELLITUS (MULTI): ICD-10-CM

## 2023-10-24 RX ORDER — GABAPENTIN 100 MG/1
100 CAPSULE ORAL 3 TIMES DAILY
Qty: 90 CAPSULE | Refills: 0 | Status: SHIPPED | OUTPATIENT
Start: 2023-10-24 | End: 2023-11-27 | Stop reason: SDUPTHER

## 2023-11-20 ENCOUNTER — APPOINTMENT (OUTPATIENT)
Dept: PRIMARY CARE | Facility: CLINIC | Age: 38
End: 2023-11-20
Payer: COMMERCIAL

## 2023-11-27 ENCOUNTER — OFFICE VISIT (OUTPATIENT)
Dept: PRIMARY CARE | Facility: CLINIC | Age: 38
End: 2023-11-27
Payer: COMMERCIAL

## 2023-11-27 VITALS
HEART RATE: 100 BPM | DIASTOLIC BLOOD PRESSURE: 85 MMHG | WEIGHT: 175 LBS | OXYGEN SATURATION: 98 % | SYSTOLIC BLOOD PRESSURE: 120 MMHG | BODY MASS INDEX: 25.84 KG/M2 | RESPIRATION RATE: 12 BRPM

## 2023-11-27 DIAGNOSIS — E11.41 DIABETIC MONONEUROPATHY ASSOCIATED WITH TYPE 2 DIABETES MELLITUS (MULTI): ICD-10-CM

## 2023-11-27 DIAGNOSIS — E11.69 TYPE 2 DIABETES MELLITUS WITH OTHER SPECIFIED COMPLICATION, UNSPECIFIED WHETHER LONG TERM INSULIN USE (MULTI): ICD-10-CM

## 2023-11-27 LAB — POC FINGERSTICK BLOOD GLUCOSE: 142 MG/DL (ref 70–100)

## 2023-11-27 PROCEDURE — 82962 GLUCOSE BLOOD TEST: CPT | Performed by: INTERNAL MEDICINE

## 2023-11-27 PROCEDURE — 3074F SYST BP LT 130 MM HG: CPT | Performed by: INTERNAL MEDICINE

## 2023-11-27 PROCEDURE — 1036F TOBACCO NON-USER: CPT | Performed by: INTERNAL MEDICINE

## 2023-11-27 PROCEDURE — 3046F HEMOGLOBIN A1C LEVEL >9.0%: CPT | Performed by: INTERNAL MEDICINE

## 2023-11-27 PROCEDURE — 3079F DIAST BP 80-89 MM HG: CPT | Performed by: INTERNAL MEDICINE

## 2023-11-27 PROCEDURE — 99214 OFFICE O/P EST MOD 30 MIN: CPT | Performed by: INTERNAL MEDICINE

## 2023-11-27 RX ORDER — GABAPENTIN 100 MG/1
200 CAPSULE ORAL 3 TIMES DAILY
Qty: 180 CAPSULE | Refills: 2 | Status: SHIPPED | OUTPATIENT
Start: 2023-11-27 | End: 2023-12-07

## 2023-11-27 NOTE — PROGRESS NOTES
Subjective   Chief complaint: Armand Hirsch is a 38 y.o. male who presents for Foot Pain (Pt is being seen for bilateral foot pain. ).    HPI:  Follow-up pain in both feet not getting better with the gabapentin 103 times a day        Objective   /85   Pulse 100   Resp 12   Wt 79.4 kg (175 lb)   SpO2 98%   BMI 25.84 kg/m²   Physical Exam  Vitals reviewed.   Constitutional:       Appearance: Normal appearance.   HENT:      Head: Normocephalic and atraumatic.   Cardiovascular:      Rate and Rhythm: Normal rate and regular rhythm.   Pulmonary:      Effort: Pulmonary effort is normal.      Breath sounds: Normal breath sounds.   Abdominal:      General: Bowel sounds are normal.      Palpations: Abdomen is soft.   Musculoskeletal:      Cervical back: Neck supple.   Skin:     General: Skin is warm and dry.   Neurological:      General: No focal deficit present.      Mental Status: He is alert.   Psychiatric:         Mood and Affect: Mood normal.         Behavior: Behavior is cooperative.         I have reviewed and reconciled the medication list with the patient today.   Current Outpatient Medications:     amLODIPine (Norvasc) 10 mg tablet, Take 1 tablet (10 mg) by mouth once daily., Disp: 90 tablet, Rfl: 3    atorvastatin (Lipitor) 20 mg tablet, Take 1 tablet (20 mg) by mouth once daily., Disp: 90 tablet, Rfl: 3    ergocalciferol (Vitamin D-2) 1.25 MG (21839 UT) capsule, Take 1 capsule (1,250 mcg) by mouth 1 (one) time per week., Disp: 12 capsule, Rfl: 3    FreeStyle Dennise 2 [place] misc, , Disp: , Rfl:     FreeStyle Dennise sensor system (FreeStyle Dennise 2 Sensor) kit, USE AS DIRECTED, Disp: 2 each, Rfl: 11    gabapentin (Neurontin) 100 mg capsule, TAKE 1 CAPSULE BY MOUTH THREE TIMES A DAY, Disp: 90 capsule, Rfl: 0    insulin glargine (Lantus Solostar U-100 Insulin) 100 unit/mL (3 mL) pen, Inject 18 Units under the skin 2 times a day. INJECT 25 UNITS SUBCUTANEOUSLY EVERY MORNING. (Patient taking differently:  "Inject 18 Units under the skin 2 times a day. Inject 18 units twice daily), Disp: 5 each, Rfl: 3    insulin lispro (HumaLOG) 100 unit/mL injection, Inject 8 Units under the skin 3 times a day with meals., Disp: 5 each, Rfl: 3    insulin syringe-needle U-100 (BD Insulin Syringe Ultra-Fine) 30G X 1/2\" 0.5 mL syringe, USE AS DIRECTED 4 TIMES DAILY, Disp: 100 each, Rfl: 11    magnesium oxide (Mag-Ox) 200 mg magnesium tablet, Take 4 tablets (800 mg) by mouth once daily., Disp: 90 tablet, Rfl: 3    pen needle, diabetic (BD Ultra-Fine Mini Pen Needle) 31 gauge x 3/16\" needle, Inject 3 times daily, Disp: 100 each, Rfl: 2    sildenafil (Viagra) 50 mg tablet, Take 1 tablet (50 mg) by mouth once daily as needed for erectile dysfunction., Disp: 12 tablet, Rfl: 11    tiZANidine (Zanaflex) 4 mg tablet, TAKE 1 TABLET (4 MG) BY MOUTH ONCE DAILY AT BEDTIME., Disp: 90 tablet, Rfl: 4    alcohol swabs pads, medicated, Apply 1 each topically once daily as needed (Use when injecting Insulin and testing blood sugar)., Disp: 90 each, Rfl: 3     Imaging:  No results found.     Labs reviewed:    Lab Results   Component Value Date    WBC 6.8 08/08/2023    HGB 12.9 (L) 08/08/2023    HCT 41.1 08/08/2023     08/08/2023    CHOL 242 (H) 08/08/2023    TRIG 176 (H) 08/08/2023    HDL 65.3 08/08/2023    ALT 15 08/08/2023    AST 16 08/08/2023     08/08/2023    K 3.9 08/08/2023     08/08/2023    CREATININE 1.12 08/08/2023    BUN 10 08/08/2023    CO2 21 08/08/2023    TSH 0.50 08/08/2023    INR 0.8 (L) 01/24/2023    HGBA1C 11.5 (A) 08/08/2023       Assessment/Plan   Problem List Items Addressed This Visit       Diabetes (CMS/[facility])    Relevant Orders    POCT fingerstick glucose manually resulted (Completed)    Diabetic mononeuropathy associated with type 2 diabetes mellitus (CMS/HCC)       Continue current medications as listed  Follow up in 4 weeks     "

## 2023-12-06 DIAGNOSIS — E11.41 DIABETIC MONONEUROPATHY ASSOCIATED WITH TYPE 2 DIABETES MELLITUS (MULTI): ICD-10-CM

## 2023-12-06 DIAGNOSIS — E11.9 TYPE 2 DIABETES MELLITUS WITHOUT COMPLICATIONS (MULTI): ICD-10-CM

## 2023-12-07 RX ORDER — FLASH GLUCOSE SENSOR
KIT MISCELLANEOUS
Qty: 2 EACH | Refills: 11 | Status: SHIPPED | OUTPATIENT
Start: 2023-12-07 | End: 2023-12-11 | Stop reason: SDUPTHER

## 2023-12-07 RX ORDER — GABAPENTIN 100 MG/1
100 CAPSULE ORAL 3 TIMES DAILY
Qty: 90 CAPSULE | Refills: 2 | Status: SHIPPED | OUTPATIENT
Start: 2023-12-07 | End: 2024-02-13

## 2023-12-11 DIAGNOSIS — E03.9 HYPOTHYROIDISM, UNSPECIFIED TYPE: ICD-10-CM

## 2023-12-11 DIAGNOSIS — I10 PRIMARY HYPERTENSION: ICD-10-CM

## 2023-12-11 DIAGNOSIS — E11.69 TYPE 2 DIABETES MELLITUS WITH OTHER SPECIFIED COMPLICATION, UNSPECIFIED WHETHER LONG TERM INSULIN USE (MULTI): ICD-10-CM

## 2023-12-11 DIAGNOSIS — E11.9 TYPE 2 DIABETES MELLITUS WITHOUT COMPLICATIONS (MULTI): ICD-10-CM

## 2023-12-11 DIAGNOSIS — N17.9 AKI (ACUTE KIDNEY INJURY) (CMS-HCC): ICD-10-CM

## 2023-12-11 DIAGNOSIS — I10 BENIGN ESSENTIAL HYPERTENSION: ICD-10-CM

## 2023-12-11 DIAGNOSIS — D50.9 IRON DEFICIENCY ANEMIA, UNSPECIFIED IRON DEFICIENCY ANEMIA TYPE: ICD-10-CM

## 2023-12-11 DIAGNOSIS — E78.00 ELEVATED LDL CHOLESTEROL LEVEL: ICD-10-CM

## 2023-12-11 RX ORDER — FLASH GLUCOSE SENSOR
KIT MISCELLANEOUS
Qty: 6 EACH | Refills: 11 | Status: SHIPPED | OUTPATIENT
Start: 2023-12-11

## 2023-12-11 RX ORDER — INSULIN LISPRO 100 [IU]/ML
15 INJECTION, SOLUTION INTRAVENOUS; SUBCUTANEOUS
Qty: 10 EACH | Refills: 3 | Status: SHIPPED | OUTPATIENT
Start: 2023-12-11 | End: 2024-01-31 | Stop reason: SDUPTHER

## 2023-12-22 ENCOUNTER — APPOINTMENT (OUTPATIENT)
Dept: PRIMARY CARE | Facility: CLINIC | Age: 38
End: 2023-12-22
Payer: COMMERCIAL

## 2023-12-22 ENCOUNTER — LAB (OUTPATIENT)
Dept: LAB | Facility: LAB | Age: 38
End: 2023-12-22
Payer: COMMERCIAL

## 2023-12-22 DIAGNOSIS — Z00.00 ANNUAL PHYSICAL EXAM: ICD-10-CM

## 2023-12-22 DIAGNOSIS — E55.9 VITAMIN D DEFICIENCY, UNSPECIFIED: ICD-10-CM

## 2023-12-22 DIAGNOSIS — N17.9 AKI (ACUTE KIDNEY INJURY) (CMS-HCC): ICD-10-CM

## 2023-12-22 DIAGNOSIS — E11.69 TYPE 2 DIABETES MELLITUS WITH OTHER SPECIFIED COMPLICATION, UNSPECIFIED WHETHER LONG TERM INSULIN USE (MULTI): ICD-10-CM

## 2023-12-22 DIAGNOSIS — D50.9 IRON DEFICIENCY ANEMIA, UNSPECIFIED IRON DEFICIENCY ANEMIA TYPE: ICD-10-CM

## 2023-12-22 DIAGNOSIS — I10 PRIMARY HYPERTENSION: ICD-10-CM

## 2023-12-22 LAB
25(OH)D3 SERPL-MCNC: 28 NG/ML (ref 30–100)
ALBUMIN SERPL BCP-MCNC: 4.2 G/DL (ref 3.4–5)
ALP SERPL-CCNC: 61 U/L (ref 33–120)
ALT SERPL W P-5'-P-CCNC: 15 U/L (ref 10–52)
ANION GAP SERPL CALC-SCNC: 15 MMOL/L (ref 10–20)
AST SERPL W P-5'-P-CCNC: 16 U/L (ref 9–39)
BILIRUB SERPL-MCNC: 0.5 MG/DL (ref 0–1.2)
BUN SERPL-MCNC: 11 MG/DL (ref 6–23)
CALCIUM SERPL-MCNC: 9.7 MG/DL (ref 8.6–10.6)
CHLORIDE SERPL-SCNC: 95 MMOL/L (ref 98–107)
CHOLEST SERPL-MCNC: 201 MG/DL (ref 0–199)
CHOLESTEROL/HDL RATIO: 3.2
CO2 SERPL-SCNC: 23 MMOL/L (ref 21–32)
CREAT SERPL-MCNC: 1.13 MG/DL (ref 0.5–1.3)
ERYTHROCYTE [DISTWIDTH] IN BLOOD BY AUTOMATED COUNT: 12.5 % (ref 11.5–14.5)
EST. AVERAGE GLUCOSE BLD GHB EST-MCNC: 226 MG/DL
GFR SERPL CREATININE-BSD FRML MDRD: 84 ML/MIN/1.73M*2
GLUCOSE SERPL-MCNC: 283 MG/DL (ref 74–99)
HBA1C MFR BLD: 9.5 %
HCT VFR BLD AUTO: 40.8 % (ref 41–52)
HDLC SERPL-MCNC: 62.1 MG/DL
HGB BLD-MCNC: 14 G/DL (ref 13.5–17.5)
LDLC SERPL CALC-MCNC: 114 MG/DL
MCH RBC QN AUTO: 28.6 PG (ref 26–34)
MCHC RBC AUTO-ENTMCNC: 34.3 G/DL (ref 32–36)
MCV RBC AUTO: 83 FL (ref 80–100)
NON HDL CHOLESTEROL: 139 MG/DL (ref 0–149)
NRBC BLD-RTO: 0 /100 WBCS (ref 0–0)
PLATELET # BLD AUTO: 238 X10*3/UL (ref 150–450)
POTASSIUM SERPL-SCNC: 4.5 MMOL/L (ref 3.5–5.3)
PROT SERPL-MCNC: 7.2 G/DL (ref 6.4–8.2)
PSA SERPL-MCNC: 0.65 NG/ML
RBC # BLD AUTO: 4.89 X10*6/UL (ref 4.5–5.9)
SODIUM SERPL-SCNC: 128 MMOL/L (ref 136–145)
TRIGL SERPL-MCNC: 124 MG/DL (ref 0–149)
TSH SERPL-ACNC: 1.37 MIU/L (ref 0.44–3.98)
VLDL: 25 MG/DL (ref 0–40)
WBC # BLD AUTO: 6.3 X10*3/UL (ref 4.4–11.3)

## 2023-12-22 PROCEDURE — 36415 COLL VENOUS BLD VENIPUNCTURE: CPT

## 2023-12-22 PROCEDURE — 80061 LIPID PANEL: CPT

## 2023-12-22 PROCEDURE — 82306 VITAMIN D 25 HYDROXY: CPT

## 2023-12-22 PROCEDURE — 84443 ASSAY THYROID STIM HORMONE: CPT

## 2023-12-22 PROCEDURE — 85027 COMPLETE CBC AUTOMATED: CPT

## 2023-12-22 PROCEDURE — 84153 ASSAY OF PSA TOTAL: CPT

## 2023-12-22 PROCEDURE — 80053 COMPREHEN METABOLIC PANEL: CPT

## 2023-12-22 PROCEDURE — 83036 HEMOGLOBIN GLYCOSYLATED A1C: CPT

## 2023-12-27 DIAGNOSIS — E11.69 TYPE 2 DIABETES MELLITUS WITH OTHER SPECIFIED COMPLICATION, UNSPECIFIED WHETHER LONG TERM INSULIN USE (MULTI): ICD-10-CM

## 2023-12-29 ENCOUNTER — APPOINTMENT (OUTPATIENT)
Dept: PRIMARY CARE | Facility: CLINIC | Age: 38
End: 2023-12-29
Payer: COMMERCIAL

## 2023-12-29 ENCOUNTER — OFFICE VISIT (OUTPATIENT)
Dept: PRIMARY CARE | Facility: CLINIC | Age: 38
End: 2023-12-29
Payer: COMMERCIAL

## 2023-12-29 VITALS
WEIGHT: 169 LBS | DIASTOLIC BLOOD PRESSURE: 77 MMHG | HEART RATE: 120 BPM | RESPIRATION RATE: 12 BRPM | BODY MASS INDEX: 24.96 KG/M2 | OXYGEN SATURATION: 98 % | SYSTOLIC BLOOD PRESSURE: 114 MMHG

## 2023-12-29 DIAGNOSIS — E55.9 VITAMIN D DEFICIENCY, UNSPECIFIED: ICD-10-CM

## 2023-12-29 DIAGNOSIS — I10 BENIGN ESSENTIAL HYPERTENSION: ICD-10-CM

## 2023-12-29 DIAGNOSIS — E11.69 TYPE 2 DIABETES MELLITUS WITH OTHER SPECIFIED COMPLICATION, UNSPECIFIED WHETHER LONG TERM INSULIN USE (MULTI): ICD-10-CM

## 2023-12-29 DIAGNOSIS — D50.9 IRON DEFICIENCY ANEMIA, UNSPECIFIED IRON DEFICIENCY ANEMIA TYPE: ICD-10-CM

## 2023-12-29 DIAGNOSIS — E78.00 ELEVATED LDL CHOLESTEROL LEVEL: ICD-10-CM

## 2023-12-29 DIAGNOSIS — E78.2 MIXED HYPERLIPIDEMIA: ICD-10-CM

## 2023-12-29 DIAGNOSIS — N17.9 AKI (ACUTE KIDNEY INJURY) (CMS-HCC): ICD-10-CM

## 2023-12-29 DIAGNOSIS — E03.9 HYPOTHYROIDISM, UNSPECIFIED TYPE: ICD-10-CM

## 2023-12-29 DIAGNOSIS — E11.41 DIABETIC MONONEUROPATHY ASSOCIATED WITH TYPE 2 DIABETES MELLITUS (MULTI): Primary | ICD-10-CM

## 2023-12-29 DIAGNOSIS — I10 PRIMARY HYPERTENSION: ICD-10-CM

## 2023-12-29 PROBLEM — R59.0 HILAR LYMPHADENOPATHY: Status: ACTIVE | Noted: 2023-02-13

## 2023-12-29 PROBLEM — D86.0 PULMONARY SARCOIDOSIS (MULTI): Status: ACTIVE | Noted: 2023-12-29

## 2023-12-29 PROBLEM — H46.9 OPTIC NEURITIS: Status: ACTIVE | Noted: 2023-02-10

## 2023-12-29 PROBLEM — E78.5 HYPERLIPIDEMIA: Status: ACTIVE | Noted: 2023-12-29

## 2023-12-29 LAB — POC FINGERSTICK BLOOD GLUCOSE: 67 MG/DL (ref 70–100)

## 2023-12-29 PROCEDURE — 3078F DIAST BP <80 MM HG: CPT | Performed by: INTERNAL MEDICINE

## 2023-12-29 PROCEDURE — 99214 OFFICE O/P EST MOD 30 MIN: CPT | Performed by: INTERNAL MEDICINE

## 2023-12-29 PROCEDURE — 3049F LDL-C 100-129 MG/DL: CPT | Performed by: INTERNAL MEDICINE

## 2023-12-29 PROCEDURE — 3046F HEMOGLOBIN A1C LEVEL >9.0%: CPT | Performed by: INTERNAL MEDICINE

## 2023-12-29 PROCEDURE — 1036F TOBACCO NON-USER: CPT | Performed by: INTERNAL MEDICINE

## 2023-12-29 PROCEDURE — 82962 GLUCOSE BLOOD TEST: CPT | Performed by: INTERNAL MEDICINE

## 2023-12-29 PROCEDURE — 3074F SYST BP LT 130 MM HG: CPT | Performed by: INTERNAL MEDICINE

## 2023-12-29 RX ORDER — PEN NEEDLE, DIABETIC 30 GX3/16"
NEEDLE, DISPOSABLE MISCELLANEOUS
Qty: 200 EACH | Refills: 2 | Status: SHIPPED | OUTPATIENT
Start: 2023-12-29 | End: 2024-05-14 | Stop reason: SDUPTHER

## 2023-12-31 NOTE — PROGRESS NOTES
Subjective   Chief complaint: Armand Hirsch is a 38 y.o. male who presents for Follow-up (Pt is here for blood work follow up, pt c/o medication given is not helping the pain in his feet. ).    HPI:  Follow-up neuropathy lower extremity the patienis not improving with gabapentin 203 times a day.  Follow-up diabetes mellitus patient is an insulin noncompliant with his insulin dosages in his diet.  Follow-up vitamin D deficiency  Follow-up increase lipid.  Follow-up anemia mild        Objective   /77   Pulse (!) 120   Resp 12   Wt 76.7 kg (169 lb)   SpO2 98%   BMI 24.96 kg/m²   Physical Exam  Vitals reviewed.   Constitutional:       Appearance: Normal appearance.   HENT:      Head: Normocephalic and atraumatic.   Cardiovascular:      Rate and Rhythm: Normal rate and regular rhythm.   Pulmonary:      Effort: Pulmonary effort is normal.      Breath sounds: Normal breath sounds.   Abdominal:      General: Bowel sounds are normal.      Palpations: Abdomen is soft.   Musculoskeletal:      Cervical back: Neck supple.   Skin:     General: Skin is warm and dry.   Neurological:      General: No focal deficit present.      Mental Status: He is alert.   Psychiatric:         Mood and Affect: Mood normal.         Behavior: Behavior is cooperative.         I have reviewed and reconciled the medication list with the patient today.   Current Outpatient Medications:     alcohol swabs pads, medicated, Apply 1 each topically once daily as needed (Use when injecting Insulin and testing blood sugar)., Disp: 90 each, Rfl: 3    amLODIPine (Norvasc) 10 mg tablet, Take 1 tablet (10 mg) by mouth once daily., Disp: 90 tablet, Rfl: 3    atorvastatin (Lipitor) 20 mg tablet, Take 1 tablet (20 mg) by mouth once daily., Disp: 90 tablet, Rfl: 3    ergocalciferol (Vitamin D-2) 1.25 MG (99206 UT) capsule, Take 1 capsule (1,250 mcg) by mouth 1 (one) time per week., Disp: 12 capsule, Rfl: 3    FreeStyle Dennise 2 [place] misc, , Disp: , Rfl:      "FreeStyle Dennise sensor system (FreeStyle Dennise 2 Sensor) kit, USE AS DIRECTED, Disp: 6 each, Rfl: 11    gabapentin (Neurontin) 100 mg capsule, TAKE 1 CAPSULE BY MOUTH THREE TIMES A DAY, Disp: 90 capsule, Rfl: 2    insulin glargine (Lantus Solostar U-100 Insulin) 100 unit/mL (3 mL) pen, Inject 18 Units under the skin 2 times a day. INJECT 25 UNITS SUBCUTANEOUSLY EVERY MORNING. (Patient taking differently: Inject 18 Units under the skin 2 times a day. Inject 18 units twice daily), Disp: 5 each, Rfl: 3    insulin lispro (HumaLOG) 100 unit/mL injection, Inject 15 Units under the skin 3 times a day with meals., Disp: 10 each, Rfl: 3    insulin syringe-needle U-100 (BD Insulin Syringe Ultra-Fine) 30G X 1/2\" 0.5 mL syringe, USE AS DIRECTED 4 TIMES DAILY, Disp: 100 each, Rfl: 11    magnesium oxide (Mag-Ox) 200 mg magnesium tablet, Take 4 tablets (800 mg) by mouth once daily., Disp: 90 tablet, Rfl: 3    pen needle, diabetic (BD Ultra-Fine Mini Pen Needle) 31 gauge x 3/16\" needle, INJECT 3 TIMES DAILY, Disp: 200 each, Rfl: 2    sildenafil (Viagra) 50 mg tablet, Take 1 tablet (50 mg) by mouth once daily as needed for erectile dysfunction., Disp: 12 tablet, Rfl: 11     Imaging:  CT ABD/PEL W IVCON    Result Date: 12/4/2023  * * *Final Report* * * DATE OF EXAM: Dec  4 2023  5:37AM   [facility]   0530  -  CT ABD/PEL W IVCON  / ACCESSION #  [id number] PROCEDURE REASON: Nausea/vomiting      * * * * Physician Interpretation * * * * RESULT: EXAMINATION:  CT ABDOMEN AND PELVIS WITH IV CONTRAST CLINICAL HISTORY: Abdominal pain, myalgias and bilateral flank pain. TECHNIQUE: CT of the abdomen and pelvis was performed using standard technique, scanning from just above the dome of the diaphragm to the symphysis pubis. MQ:  CTAP_3 Contrast: IV:  95 ml of Omnipaque 300 CT Radiation dose: Integrated Dose-length product (DLP) for this visit =   340 mGy*cm. CT Dose Reduction Employed: Automated exposure control (AEC) COMPARISON: None. RESULT: Liver: No " mass. Biliary: No bile duct dilation. Spleen: No mass. No splenomegaly. Pancreas: No mass or duct dilation. Adrenals: No mass. Kidneys: No mass, calculus or hydronephrosis. GI tract: No dilation or wall thickening. Normal appendix.   Lymph nodes: No abdominal or pelvic lymphadenopathy. Mesentery/Peritoneum: No ascites or mass. Retroperitoneum: No mass. Vasculature: Unremarkable. Pelvis: No mass, ascites or fluid collection. Bones/Soft Tissues: Subcutaneous injection sites are seen in the anterior abdominal wall. Lower thorax: Unremarkable.  (topogram) images: No additional findings.    IMPRESSION: No acute findings. Transcribed Using Voice Recognition Transcribe Date/Time: Dec  4 2023  5:38A Dictated by: SONIA SIMPSON MD This examination was interpreted and the report reviewed and electronically signed by: SONIA SIMPSON MD on Dec  4 2023  6:01AM  EST    XR CHEST 1V FRONTAL PORT    Result Date: 12/4/2023  * * *Final Report* * * DATE OF EXAM: Dec  4 2023  2:47AM   SPX   [id number]  -  XR CHEST 1V FRONTAL PORT  / ACCESSION #  [id number] PROCEDURE REASON: Shortness of breath      * * * * Physician Interpretation * * * * RESULT: EXAMINATION:  CHEST RADIOGRAPH (PORTABLE SINGLE VIEW AP) Exam Date/Time:  12/4/2023 2:47 AM CLINICAL HISTORY: Shortness of breath MQ:  XCPR_5 Comparison:  07/24/2023 RESULT: Lines, tubes, and devices:  None. Lungs and pleura:  Clear without pleural effusion or pneumothorax. Cardiomediastinal silhouette:  Unremarkable cardiomediastinal silhouette. Other:  .    IMPRESSION: Negative chest. Transcribed Using Voice Recognition Transcribe Date/Time: Dec  4 2023  2:47A Dictated by: LILLIAN POWELL MD This examination was interpreted and the report reviewed and electronically signed by: LILLIAN POWELL MD on Dec  4 2023  2:58AM  EST       Labs reviewed:    Lab Results   Component Value Date    WBC 6.3 12/22/2023    HGB 14.0 12/22/2023    HCT 40.8 (L) 12/22/2023     12/22/2023    CHOL 201 (H) 12/22/2023     TRIG 124 12/22/2023    HDL 62.1 12/22/2023    ALT 15 12/22/2023    AST 16 12/22/2023     (L) 12/22/2023    K 4.5 12/22/2023    CL 95 (L) 12/22/2023    CREATININE 1.13 12/22/2023    BUN 11 12/22/2023    CO2 23 12/22/2023    TSH 1.37 12/22/2023    INR 0.8 (L) 01/24/2023    HGBA1C 9.5 (H) 12/22/2023       Assessment/Plan   Problem List Items Addressed This Visit       Anemia    Diabetes (CMS/[facility])    Relevant Orders    POCT fingerstick glucose manually resulted (Completed)    Hemoglobin A1C    Hypertension    Relevant Orders    Comprehensive Metabolic Panel    Lipid Panel    FATOUMATA (acute kidney injury) (CMS/[facility])    Relevant Orders    Comprehensive Metabolic Panel    Lipid Panel    Hemoglobin A1C     Other Visit Diagnoses       Hypothyroidism, unspecified type        Benign essential hypertension        Elevated LDL cholesterol level                Continue current medications as listed  Follow up in 3 months check hemoglobin A1c CMP CBC

## 2023-12-31 NOTE — ASSESSMENT & PLAN NOTE
Continue insulin using the Lantus and the scale.  Patient is using lispro 8 units before each meal in addition to the scale also he will continue using the Lantus twice a day will check hemoglobin A1c in 3 months.  
Follow low-fat diet  Flaxseed 1200 mcg 3 times a day.  
Increase gabapentin to 300  3 times a day times a day    
Labs reviewed with patient, vitamin D is low at 18. Will refill vitamin D weekly supplements.  
Very mild we will monitor.  
Other (Free Text): Irritated Seborrheic Keratosis: Counseling. Reassurance. **NO ABN WILL BE SIGNED, BUT PATIENT IS AWARE THESE LESION WILL BE SUBMITTED TO THEIR INSURANCE AND STILL COULD GET A POTENTIAL KICK BACK IF RENDERED NOT NECESSARY**\\n \\n Patient is aware lesions are benign. \\n Patient is aware lesions have the potential to come back. \\n Patient are aware that lesions may fall off in 2-3 weeks. \\n Patient is aware that removal is by LN2. \\n Patient is aware that lesions may look worse before looking better (pink, puffy, scab, may or may not blister and peel). \\n Patient is aware they can use OTC Hydrocortisone 1% ointment to help with the itch and irritation. \\n Patient is aware to not pick or irritate lesions. \\n Patient is aware that lesions should fall off on their own. \\n Patient is aware scaring can happen after lesions have fallen off. \\n Patient is aware sun protection (30-50 SPF+) everyday to help prevent hyperpigmentation and hypopigmentation on areas removed. \\n Patient is aware that lesions will be submitted to insurance. \\n Patient was given LN2 handout. ; Follow up in 1 year.
Note Text (......Xxx Chief Complaint.): This diagnosis correlates with the
Detail Level: Zone

## 2024-01-02 ENCOUNTER — APPOINTMENT (OUTPATIENT)
Dept: PRIMARY CARE | Facility: CLINIC | Age: 39
End: 2024-01-02
Payer: COMMERCIAL

## 2024-01-31 DIAGNOSIS — E11.9 TYPE 2 DIABETES MELLITUS WITHOUT COMPLICATIONS (MULTI): ICD-10-CM

## 2024-01-31 DIAGNOSIS — E11.69 TYPE 2 DIABETES MELLITUS WITH OTHER SPECIFIED COMPLICATION, UNSPECIFIED WHETHER LONG TERM INSULIN USE (MULTI): ICD-10-CM

## 2024-01-31 RX ORDER — INSULIN GLARGINE 100 [IU]/ML
18 INJECTION, SOLUTION SUBCUTANEOUS 2 TIMES DAILY
Qty: 5 EACH | Refills: 3 | Status: SHIPPED | OUTPATIENT
Start: 2024-01-31 | End: 2024-03-11 | Stop reason: ALTCHOICE

## 2024-01-31 RX ORDER — INSULIN LISPRO 100 [IU]/ML
18 INJECTION, SOLUTION INTRAVENOUS; SUBCUTANEOUS
Qty: 10 EACH | Refills: 3 | Status: SHIPPED | OUTPATIENT
Start: 2024-01-31

## 2024-02-08 ENCOUNTER — PATIENT OUTREACH (OUTPATIENT)
Dept: CARE COORDINATION | Facility: CLINIC | Age: 39
End: 2024-02-08
Payer: COMMERCIAL

## 2024-02-08 ENCOUNTER — DOCUMENTATION (OUTPATIENT)
Dept: PRIMARY CARE | Facility: CLINIC | Age: 39
End: 2024-02-08
Payer: COMMERCIAL

## 2024-02-08 NOTE — PROGRESS NOTES
Discharge Facility:[facility]  Discharge Diagnosis:Hyperkalemia , DKA  Admission Date:02/04/24  Discharge Date: 02/07/24    PCP Appointment Date:02/20/24  Specialist Appointment Date:   Hospital Encounter and Summary: Linked   See discharge assessment below for further details  Engagement  Call Start Time: 0102 (2/8/2024  1:02 PM)    Medications  Medications reviewed with patient/caregiver?: Yes (2/8/2024  1:02 PM)  Is the patient having any side effects they believe may be caused by any medication additions or changes?: No (2/8/2024  1:02 PM)  Does the patient have all medications ordered at discharge?: No (having issues with the script that was written and will address this with the Dr) (2/8/2024  1:02 PM)  Is the patient taking all medications as directed (includes completed medication regime)?: Yes (2/8/2024  1:02 PM)    Appointments  Does the patient have a primary care provider?: Yes (2/8/2024  1:02 PM)  Care Management Interventions: Verified appointment date/time/provider (2/8/2024  1:02 PM)    Self Management  Has home health visited the patient within 72 hours of discharge?: No (2/8/2024  1:02 PM)  Has all Durable Medical Equipment (DME) been delivered?: No (2/8/2024  1:02 PM)    Patient Teaching  Does the patient have access to their discharge instructions?: Yes (2/8/2024  1:02 PM)  Care Management Interventions: Reviewed instructions with patient (2/8/2024  1:02 PM)  What is the patient's perception of their health status since discharge?: Improving (2/8/2024  1:02 PM)    Wrap Up  Call End Time: 0115 (2/8/2024  1:02 PM)

## 2024-02-12 DIAGNOSIS — E11.41 DIABETIC MONONEUROPATHY ASSOCIATED WITH TYPE 2 DIABETES MELLITUS (MULTI): ICD-10-CM

## 2024-02-13 RX ORDER — GABAPENTIN 100 MG/1
100 CAPSULE ORAL 3 TIMES DAILY
Qty: 90 CAPSULE | Refills: 2 | Status: SHIPPED | OUTPATIENT
Start: 2024-02-13

## 2024-02-19 ENCOUNTER — DOCUMENTATION (OUTPATIENT)
Dept: PRIMARY CARE | Facility: CLINIC | Age: 39
End: 2024-02-19
Payer: COMMERCIAL

## 2024-02-28 PROCEDURE — 99222 1ST HOSP IP/OBS MODERATE 55: CPT | Performed by: INTERNAL MEDICINE

## 2024-02-29 PROCEDURE — 99232 SBSQ HOSP IP/OBS MODERATE 35: CPT | Performed by: INTERNAL MEDICINE

## 2024-03-01 PROCEDURE — 99232 SBSQ HOSP IP/OBS MODERATE 35: CPT | Performed by: INTERNAL MEDICINE

## 2024-03-02 PROCEDURE — 99232 SBSQ HOSP IP/OBS MODERATE 35: CPT | Performed by: INTERNAL MEDICINE

## 2024-03-03 PROCEDURE — 99232 SBSQ HOSP IP/OBS MODERATE 35: CPT | Performed by: INTERNAL MEDICINE

## 2024-03-04 PROCEDURE — 99238 HOSP IP/OBS DSCHRG MGMT 30/<: CPT | Performed by: INTERNAL MEDICINE

## 2024-03-05 ENCOUNTER — PATIENT OUTREACH (OUTPATIENT)
Dept: CARE COORDINATION | Facility: CLINIC | Age: 39
End: 2024-03-05
Payer: COMMERCIAL

## 2024-03-05 NOTE — PROGRESS NOTES
Discharge Facility:[facility]   Discharge Diagnosis:DKA  Admission Date:02/27/24  Discharge Date: 03/04/24    PCP Appointment Date:03/11/24  Specialist Appointment Date:   Hospital Encounter and Summary: Linked   See discharge assessment below for further details  /Engagement  Call Start Time: 0900 (3/5/2024  9:00 AM)    Medications  Medications reviewed with patient/caregiver?: Yes (no new meds) (3/5/2024  9:00 AM)  Is the patient having any side effects they believe may be caused by any medication additions or changes?: No (3/5/2024  9:00 AM)  Does the patient have all medications ordered at discharge?: Not applicable (3/5/2024  9:00 AM)  Is the patient taking all medications as directed (includes completed medication regime)?: Yes (3/5/2024  9:00 AM)    Appointments  Does the patient have a primary care provider?: Yes (3/5/2024  9:00 AM)  Care Management Interventions: Verified appointment date/time/provider (3/5/2024  9:00 AM)  Care Management Interventions: Advised patient to keep appointment (3/5/2024  9:00 AM)    Self Management  Has home health visited the patient within 72 hours of discharge?: Not applicable (3/5/2024  9:00 AM)  Has all Durable Medical Equipment (DME) been delivered?: No (3/5/2024  9:00 AM)    Patient Teaching  Does the patient have access to their discharge instructions?: Yes (3/5/2024  9:00 AM)  Care Management Interventions: Reviewed instructions with patient (3/5/2024  9:00 AM)  What is the patient's perception of their health status since discharge?: Improving (3/5/2024  9:00 AM)    Wrap Up  Call End Time: 0915 (3/5/2024  9:00 AM)

## 2024-03-11 ENCOUNTER — OFFICE VISIT (OUTPATIENT)
Dept: PRIMARY CARE | Facility: CLINIC | Age: 39
End: 2024-03-11
Payer: COMMERCIAL

## 2024-03-11 VITALS
DIASTOLIC BLOOD PRESSURE: 68 MMHG | SYSTOLIC BLOOD PRESSURE: 99 MMHG | OXYGEN SATURATION: 98 % | WEIGHT: 168 LBS | BODY MASS INDEX: 24.81 KG/M2 | RESPIRATION RATE: 12 BRPM | HEART RATE: 105 BPM

## 2024-03-11 DIAGNOSIS — E11.9 TYPE 2 DIABETES MELLITUS WITHOUT COMPLICATIONS (MULTI): ICD-10-CM

## 2024-03-11 DIAGNOSIS — E10.65 POORLY CONTROLLED TYPE 1 DIABETES MELLITUS (MULTI): Primary | ICD-10-CM

## 2024-03-11 PROBLEM — E03.9 HYPOTHYROIDISM: Status: ACTIVE | Noted: 2024-03-11

## 2024-03-11 PROBLEM — R41.0 CONFUSION: Status: ACTIVE | Noted: 2024-02-20

## 2024-03-11 PROBLEM — E87.5 HYPERKALEMIA: Status: ACTIVE | Noted: 2024-02-18

## 2024-03-11 PROBLEM — D50.9 IRON DEFICIENCY ANEMIA: Status: ACTIVE | Noted: 2023-03-20

## 2024-03-11 LAB — POC FINGERSTICK BLOOD GLUCOSE: 281 MG/DL (ref 70–100)

## 2024-03-11 PROCEDURE — 99214 OFFICE O/P EST MOD 30 MIN: CPT | Performed by: INTERNAL MEDICINE

## 2024-03-11 PROCEDURE — 3078F DIAST BP <80 MM HG: CPT | Performed by: INTERNAL MEDICINE

## 2024-03-11 PROCEDURE — 82962 GLUCOSE BLOOD TEST: CPT | Performed by: INTERNAL MEDICINE

## 2024-03-11 PROCEDURE — 1036F TOBACCO NON-USER: CPT | Performed by: INTERNAL MEDICINE

## 2024-03-11 PROCEDURE — 3074F SYST BP LT 130 MM HG: CPT | Performed by: INTERNAL MEDICINE

## 2024-03-11 RX ORDER — INSULIN HUMAN 100 [IU]/ML
INJECTION, SUSPENSION SUBCUTANEOUS
COMMUNITY
End: 2024-04-03

## 2024-03-11 RX ORDER — SYRINGE-NEEDLE,INSULIN,0.5 ML 30 G X1/2"
SYRINGE, EMPTY DISPOSABLE MISCELLANEOUS
Qty: 100 EACH | Refills: 11 | Status: SHIPPED | OUTPATIENT
Start: 2024-03-11

## 2024-03-11 RX ORDER — SODIUM CHLORIDE 1 G/1
2 TABLET ORAL 2 TIMES DAILY
COMMUNITY
Start: 2024-02-24 | End: 2024-03-25

## 2024-03-11 NOTE — ASSESSMENT & PLAN NOTE
Patient was taking NPH 70/30 insulin 30 units with breakfast and 15 units before bed.   Advised him to take 15 units with dinner.   DC Lantus.  Continue regimen from hospital discharge.   Will recheck A1C in one month.   Insulin syringes refilled.

## 2024-03-11 NOTE — PROGRESS NOTES
Subjective   Chief complaint: Armand Hirsch is a 38 y.o. male who presents for Hospital Follow-up (PT is here for hospital follow up for loss of consciousness ).    HPI:  HPI  Patient was admitted to [facility] from 2/27/24-3/4/24 for DKA. His adjusted insulin regimen since discharge is:   NPH 70/30 insulin: 30-0-15-0  Supplemental sliding scale: Regular #1 AC/#1 HS  Accucheck TID.     Since DC from hospital, he reports his family has been assisting him with his medications and diet. He states his blood sugar in the mornings have been into the 50's. Otherwise no complaints.     Objective   BP 99/68   Pulse 105   Resp 12   Wt 76.2 kg (168 lb)   SpO2 98%   BMI 24.81 kg/m²   Physical Exam  Constitutional:       General: He is not in acute distress.     Appearance: Normal appearance. He is not diaphoretic.   HENT:      Head: Normocephalic and atraumatic.      Mouth/Throat:      Mouth: Mucous membranes are moist.      Pharynx: Oropharynx is clear. No posterior oropharyngeal erythema.   Cardiovascular:      Rate and Rhythm: Regular rhythm. Tachycardia present.      Pulses: Normal pulses.      Heart sounds: Normal heart sounds. No murmur heard.  Pulmonary:      Effort: Pulmonary effort is normal. No respiratory distress.      Breath sounds: Normal breath sounds. No wheezing, rhonchi or rales.   Abdominal:      General: Bowel sounds are normal.      Palpations: Abdomen is soft.      Tenderness: There is no abdominal tenderness.   Musculoskeletal:         General: Normal range of motion.      Right lower leg: No edema.      Left lower leg: No edema.   Skin:     General: Skin is warm and dry.      Capillary Refill: Capillary refill takes less than 2 seconds.   Neurological:      General: No focal deficit present.      Mental Status: He is alert and oriented to person, place, and time. Mental status is at baseline.      Cranial Nerves: No cranial nerve deficit.      Sensory: No sensory deficit.      Motor: No  "weakness.      Gait: Gait normal.      Comments: A&O x 3, to person, place, and time. CN II-XII intact.          I have reviewed and reconciled the medication list with the patient today.   Current Outpatient Medications:     amLODIPine (Norvasc) 10 mg tablet, Take 1 tablet (10 mg) by mouth once daily., Disp: 90 tablet, Rfl: 3    atorvastatin (Lipitor) 20 mg tablet, Take 1 tablet (20 mg) by mouth once daily., Disp: 90 tablet, Rfl: 3    sodium chloride 1,000 mg tablet, Take 2 tablets (2 g) by mouth twice a day., Disp: , Rfl:     alcohol swabs pads, medicated, Apply 1 each topically once daily as needed (Use when injecting Insulin and testing blood sugar)., Disp: 90 each, Rfl: 3    ergocalciferol (Vitamin D-2) 1.25 MG (99685 UT) capsule, Take 1 capsule (1,250 mcg) by mouth 1 (one) time per week., Disp: 12 capsule, Rfl: 3    FreeStyle Dennise 2 [place] misc, , Disp: , Rfl:     FreeStyle Dennise sensor system (FreeStyle Dennise 2 Sensor) kit, USE AS DIRECTED, Disp: 6 each, Rfl: 11    gabapentin (Neurontin) 100 mg capsule, TAKE 1 CAPSULE BY MOUTH THREE TIMES A DAY, Disp: 90 capsule, Rfl: 2    HumuLIN 70/30 U-100 Insulin 100 unit/mL (70-30) injection, INJECT 300UNITS DIALY WITH BREAKFAST AND INJECT 15 UNITS DAILY WITH DINNER, Disp: , Rfl:     insulin lispro (HumaLOG) 100 unit/mL injection, Inject 18 Units under the skin 3 times a day with meals., Disp: 10 each, Rfl: 3    insulin syringe-needle U-100 (BD Insulin Syringe Ultra-Fine) 30G X 1/2\" 0.5 mL syringe, USE AS DIRECTED 4 TIMES DAILY, Disp: 100 each, Rfl: 11    magnesium oxide (Mag-Ox) 200 mg magnesium tablet, Take 4 tablets (800 mg) by mouth once daily., Disp: 90 tablet, Rfl: 3    pen needle, diabetic (BD Ultra-Fine Mini Pen Needle) 31 gauge x 3/16\" needle, INJECT 3 TIMES DAILY, Disp: 200 each, Rfl: 2    sildenafil (Viagra) 50 mg tablet, Take 1 tablet (50 mg) by mouth once daily as needed for erectile dysfunction., Disp: 12 tablet, Rfl: 11     Imaging:  XR chest 1 " view    Result Date: 2/29/2024  * * *Final Report* * * DATE OF EXAM: Feb 28 2024  6:17PM   MMX   [id number]  -  XR CHEST 1V FRONTAL PORT  / ACCESSION #  [id number] PROCEDURE REASON: Cough      * * * * Physician Interpretation * * * *  EXAMINATION:  CHEST RADIOGRAPH (PORTABLE SINGLE VIEW AP) Exam Date/Time:  2/28/2024 6:17 PM CLINICAL HISTORY: Cough MQ:  XCPR_5 Comparison:  02/27/2024 RESULT: Lines, tubes, and devices:  None. Lungs and pleura:  Crowded lung markings in the presence of hypoinflation.  Curvilinear infrahilar opacities.  No pneumothorax.   Costophrenic angles are clear. Cardiomediastinal silhouette:  Normal cardiomediastinal silhouette. Other:  .    IMPRESSION: Suspect bibasilar atelectasis in the presence of hypoinflation. : [facility]OILA   Transcribe Date/Time: Feb 29 2024  7:44A Dictated by : LUIS M CRESPO MD This examination was interpreted and the report reviewed and electronically signed by: LUIS M CRESPO MD on Feb 29 2024  7:45AM  EST    XR chest 1 view    Result Date: 2/27/2024  * * *Final Report* * * DATE OF EXAM: Feb 27 2024  3:25PM   MMX   [id number]  -  XR CHEST 1V FRONTAL PORT  / ACCESSION #  [id number] PROCEDURE REASON: Shortness of breath      * * * * Physician Interpretation * * * *  EXAMINATION:  CHEST RADIOGRAPH (PORTABLE SINGLE VIEW AP) Exam Date/Time:  2/27/2024 3:25 PM Clinical History:  Shortness of breath Comparison:  02/18/2024 RESULT: LINES, TUBES, AND DEVICES:  None. CARDIOMEDIASTINAL SILHOUETTE:  The cardiac and mediastinal silhouettes appear unremarkable. LUNGS AND PLEURA: No consolidation.  No pleural effusion. No pulmonary vascular congestion. No pneumothorax identified. OTHER:  N/A    IMPRESSION: No acute cardiopulmonary process. : [facility]   Transcribe Date/Time: Feb 27 2024  3:29P Dictated by : JONNIE OROZCO MD This examination was interpreted and the report reviewed and electronically signed by: JONNIE OROZCO MD on Feb 27 2024  3:30PM  EST    XR chest 1  view    Result Date: 2/19/2024  * * *Final Report* * * DATE OF EXAM: Feb 18 2024 12:22PM   SPX   [id number]  -  XR CHEST 1V FRONTAL PORT  / ACCESSION #  [id number] PROCEDURE REASON: Shortness of breath      * * * * Physician Interpretation * * * * RESULT: EXAMINATION:  CHEST RADIOGRAPH (PORTABLE SINGLE VIEW AP) Exam Date/Time:  2/18/2024 12:22 PM CLINICAL HISTORY: Shortness of breath MQ:  XCPR_5 Comparison:  2/4/2024 RESULT: There has been a relatively shallow inspiration.  The heart is normal in size.  There is no darin pulmonary consolidation, pleural effusion, pneumothorax, or pulmonary vascular redistribution.    IMPRESSION: Relatively shallow inspiration.  No acute pulmonary process is identified. Transcribed Using Voice Recognition Transcribe Date/Time: Feb 19 2024  7:46A Dictated by: ROSA HERNÁNDEZ MD This examination was interpreted and the report reviewed and electronically signed by: ROSA HERNÁNDEZ MD on Feb 19 2024  7:47AM  EST    CT head wo IV contrast    Result Date: 2/18/2024  * * *Final Report* * * DATE OF EXAM: Feb 18 2024 10:13AM   SPC   [id number]  -  CT BRAIN WO IVCON  / ACCESSION #  [id number] PROCEDURE REASON: Mental status change, unknown cause      * * * * Physician Interpretation * * * * RESULT: EXAMINATION:  CT BRAIN WO IVCON CLINICAL HISTORY:  Mental status change, unknown cause  Mental status change TECHNIQUE:  Serial axial images without IV contrast were obtained from the vertex to the foramen magnum. MQ:  CTBWO_3 CT Dose-Length Product (DLP): 822  mGy*cm CT Dose Reduction Employed: No dose reduction techniques were required COMPARISON:  CT scan from 2/4/2024. MRI of the brain from 10/22/2022 RESULT: Post-operative change:  None. Acute change:   No evidence of an acute infarct or other acute parenchymal process. Hemorrhage:    No evidence of acute intracranial hemorrhage. ECASS hemorrhagic transformation score: Not Applicable Mass effect / Mass lesion:   There is no evidence of an intracranial mass  or extraaxial fluid collection.  No significant mass effect. Chronic change:   Small area of low density suggesting compatible with a small remote LEFT parietal infarct also seen on prior MRI is again seen an additional minimal areas of low attenuation in the supratentorial white matter are grossly similar to the prior imaging. Parenchyma:  There is no significant volume loss. Ventricles:   The ventricles are within normal limits of size and configuration for age. Paranasal sinuses and skull base:  The visualized paranasal sinuses are grossly clear.  (topogram) images: No additional findings. -    IMPRESSION: 1.  No acute intracranial findings. 2. 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 Transcribed Using Voice Recognition Transcribe Date/Time: Feb 18 2024 10:17A Dictated by: SERA RICHADRSON MD This examination was interpreted and the report reviewed and electronically signed by: SERA RICHARDSON MD on Feb 18 2024 10:20AM  EST       Labs reviewed:    Lab Results   Component Value Date    WBC 6.3 12/22/2023    HGB 14.0 12/22/2023    HCT 40.8 (L) 12/22/2023     12/22/2023    CHOL 201 (H) 12/22/2023    TRIG 124 12/22/2023    HDL 62.1 12/22/2023    ALT 15 12/22/2023    AST 16 12/22/2023     (L) 12/22/2023    K 4.5 12/22/2023    CL 95 (L) 12/22/2023    CREATININE 1.13 12/22/2023    BUN 11 12/22/2023    CO2 23 12/22/2023    TSH 1.37 12/22/2023    INR 0.8 (L) 01/24/2023    HGBA1C 11.4 (H) 02/19/2024       Assessment/Plan   Problem List Items Addressed This Visit          Endocrine/Metabolic    Poorly controlled type 1 diabetes mellitus (CMS/HCC) - Primary     Patient was taking NPH 70/30 insulin 30 units with breakfast and 15 units before bed.   Advised him to take 15 units with dinner.   Continue regimen from hospital discharge.   Will recheck A1C in one month.   Insulin syringes refilled.          Relevant Orders    POCT fingerstick glucose manually  resulted (Completed)     Other Visit Diagnoses       Type 2 diabetes mellitus without complications (CMS/[facility])                Continue current medications as listed  Follow up in 1 month for A1C check and DM follow up.

## 2024-03-15 ENCOUNTER — PATIENT OUTREACH (OUTPATIENT)
Dept: CARE COORDINATION | Facility: CLINIC | Age: 39
End: 2024-03-15
Payer: COMMERCIAL

## 2024-03-15 NOTE — PROGRESS NOTES
Call regarding appt. with PCP on  03/11/24 after hospitalization.  At time of outreach call the patient feels as if their condition has improved  since last visit.  Reviewed the PCP appointment with the pt and addressed any questions or concerns.

## 2024-03-21 ENCOUNTER — APPOINTMENT (OUTPATIENT)
Dept: PRIMARY CARE | Facility: CLINIC | Age: 39
End: 2024-03-21
Payer: COMMERCIAL

## 2024-04-02 DIAGNOSIS — E10.9 TYPE 1 DIABETES MELLITUS WITHOUT COMPLICATIONS (MULTI): Chronic | ICD-10-CM

## 2024-04-03 DIAGNOSIS — E10.9 TYPE 1 DIABETES MELLITUS WITHOUT COMPLICATIONS (MULTI): Chronic | ICD-10-CM

## 2024-04-03 RX ORDER — INSULIN HUMAN 100 [IU]/ML
INJECTION, SUSPENSION SUBCUTANEOUS
Qty: 10 ML | Refills: 3 | Status: SHIPPED | OUTPATIENT
Start: 2024-04-03 | End: 2024-04-03 | Stop reason: SDUPTHER

## 2024-04-03 RX ORDER — INSULIN HUMAN 100 [IU]/ML
INJECTION, SUSPENSION SUBCUTANEOUS
Qty: 10 ML | Refills: 3 | Status: SHIPPED | OUTPATIENT
Start: 2024-04-03

## 2024-04-04 ENCOUNTER — PATIENT OUTREACH (OUTPATIENT)
Dept: CARE COORDINATION | Facility: CLINIC | Age: 39
End: 2024-04-04
Payer: COMMERCIAL

## 2024-04-04 NOTE — PROGRESS NOTES
/Call placed regarding one month post discharge follow up call.  At time of outreach call the patient feels as if their condition has (improved since initial visit with PCP or specialist.  Questions or concerns regarding recovery period addressed at this time. (Individualize as needed if questions arise)  Reviewed any PCP or specialists progress notes/labs/radiology reports if applicable and addressed any questions or concerns.

## 2024-05-06 ENCOUNTER — HOSPITAL ENCOUNTER (EMERGENCY)
Facility: HOSPITAL | Age: 39
Discharge: OTHER INSTITUTION | End: 2024-05-06
Payer: COMMERCIAL

## 2024-05-06 ENCOUNTER — PATIENT OUTREACH (OUTPATIENT)
Dept: CARE COORDINATION | Facility: CLINIC | Age: 39
End: 2024-05-06
Payer: COMMERCIAL

## 2024-05-06 PROCEDURE — 4500999001 HC ED NO CHARGE

## 2024-05-06 NOTE — PROGRESS NOTES
Discharge Facility:[facility]  Discharge Diagnosis:Type 1 Diabetes Mellitis with ketoacidosis  Admission Date:04/30/24  Discharge Date: 05/03/24    PCP Appointment Date:05/13/24  Specialist Appointment Date:   Hospital Encounter and Summary: Linked  See discharge assessment below for further details  Engagement  Call Start Time: 0100 (5/6/2024  1:00 PM)    Medications  Medications reviewed with patient/caregiver?: Yes (no new meds) (5/6/2024  1:00 PM)  Is the patient having any side effects they believe may be caused by any medication additions or changes?: No (5/6/2024  1:00 PM)  Does the patient have all medications ordered at discharge?: Not applicable (5/6/2024  1:00 PM)  Is the patient taking all medications as directed (includes completed medication regime)?: Yes (5/6/2024  1:00 PM)    Appointments  Does the patient have a primary care provider?: Yes (5/6/2024  1:00 PM)  Care Management Interventions: Verified appointment date/time/provider (5/6/2024  1:00 PM)    Self Management  Has home health visited the patient within 72 hours of discharge?: Not applicable (5/6/2024  1:00 PM)  Has all Durable Medical Equipment (DME) been delivered?: No (5/6/2024  1:00 PM)    Patient Teaching  Does the patient have access to their discharge instructions?: Yes (5/6/2024  1:00 PM)  Care Management Interventions: Reviewed instructions with patient (5/6/2024  1:00 PM)  What is the patient's perception of their health status since discharge?: Improving (5/6/2024  1:00 PM)    Wrap Up  Call End Time: 0110 (5/6/2024  1:00 PM)

## 2024-05-07 ENCOUNTER — HOSPITAL ENCOUNTER (EMERGENCY)
Facility: HOSPITAL | Age: 39
Discharge: HOME | End: 2024-05-07
Attending: GENERAL PRACTICE
Payer: COMMERCIAL

## 2024-05-07 VITALS
HEART RATE: 85 BPM | HEIGHT: 72 IN | SYSTOLIC BLOOD PRESSURE: 124 MMHG | DIASTOLIC BLOOD PRESSURE: 84 MMHG | RESPIRATION RATE: 20 BRPM | TEMPERATURE: 97.9 F | WEIGHT: 168 LBS | BODY MASS INDEX: 22.75 KG/M2 | OXYGEN SATURATION: 97 %

## 2024-05-07 DIAGNOSIS — E08.10 DIABETIC KETOACIDOSIS WITHOUT COMA ASSOCIATED WITH DIABETES MELLITUS DUE TO UNDERLYING CONDITION (MULTI): Primary | ICD-10-CM

## 2024-05-07 LAB
ALBUMIN SERPL BCP-MCNC: 4.5 G/DL (ref 3.4–5)
ALP SERPL-CCNC: 91 U/L (ref 33–120)
ALT SERPL W P-5'-P-CCNC: 18 U/L (ref 10–52)
AMPHETAMINES UR QL SCN: NORMAL
ANION GAP BLDV CALCULATED.4IONS-SCNC: 15 MMOL/L (ref 10–25)
ANION GAP BLDV CALCULATED.4IONS-SCNC: 17 MMOL/L (ref 10–25)
ANION GAP SERPL CALC-SCNC: 13 MMOL/L (ref 10–20)
ANION GAP SERPL CALC-SCNC: 20 MMOL/L (ref 10–20)
ANION GAP SERPL CALC-SCNC: 22 MMOL/L (ref 10–20)
AST SERPL W P-5'-P-CCNC: 10 U/L (ref 9–39)
BARBITURATES UR QL SCN: NORMAL
BASE EXCESS BLDV CALC-SCNC: -3.4 MMOL/L (ref -2–3)
BASE EXCESS BLDV CALC-SCNC: -7.5 MMOL/L (ref -2–3)
BASOPHILS # BLD AUTO: 0.03 X10*3/UL (ref 0–0.1)
BASOPHILS NFR BLD AUTO: 0.5 %
BENZODIAZ UR QL SCN: NORMAL
BILIRUB SERPL-MCNC: 0.5 MG/DL (ref 0–1.2)
BODY TEMPERATURE: 37 DEGREES CELSIUS
BODY TEMPERATURE: 37 DEGREES CELSIUS
BUN SERPL-MCNC: 15 MG/DL (ref 6–23)
BUN SERPL-MCNC: 18 MG/DL (ref 6–23)
BUN SERPL-MCNC: 21 MG/DL (ref 6–23)
BZE UR QL SCN: NORMAL
CA-I BLDV-SCNC: 1.31 MMOL/L (ref 1.1–1.33)
CA-I BLDV-SCNC: 1.32 MMOL/L (ref 1.1–1.33)
CALCIUM SERPL-MCNC: 8.9 MG/DL (ref 8.6–10.3)
CALCIUM SERPL-MCNC: 9 MG/DL (ref 8.6–10.3)
CALCIUM SERPL-MCNC: 9.8 MG/DL (ref 8.6–10.3)
CANNABINOIDS UR QL SCN: NORMAL
CHLORIDE BLDV-SCNC: 100 MMOL/L (ref 98–107)
CHLORIDE BLDV-SCNC: 92 MMOL/L (ref 98–107)
CHLORIDE SERPL-SCNC: 103 MMOL/L (ref 98–107)
CHLORIDE SERPL-SCNC: 90 MMOL/L (ref 98–107)
CHLORIDE SERPL-SCNC: 99 MMOL/L (ref 98–107)
CO2 SERPL-SCNC: 18 MMOL/L (ref 21–32)
CO2 SERPL-SCNC: 22 MMOL/L (ref 21–32)
CO2 SERPL-SCNC: 24 MMOL/L (ref 21–32)
CREAT SERPL-MCNC: 0.87 MG/DL (ref 0.5–1.3)
CREAT SERPL-MCNC: 1.03 MG/DL (ref 0.5–1.3)
CREAT SERPL-MCNC: 1.3 MG/DL (ref 0.5–1.3)
EGFRCR SERPLBLD CKD-EPI 2021: 71 ML/MIN/1.73M*2
EGFRCR SERPLBLD CKD-EPI 2021: >90 ML/MIN/1.73M*2
EGFRCR SERPLBLD CKD-EPI 2021: >90 ML/MIN/1.73M*2
EOSINOPHIL # BLD AUTO: 0.01 X10*3/UL (ref 0–0.7)
EOSINOPHIL NFR BLD AUTO: 0.2 %
ERYTHROCYTE [DISTWIDTH] IN BLOOD BY AUTOMATED COUNT: 13.2 % (ref 11.5–14.5)
FENTANYL+NORFENTANYL UR QL SCN: NORMAL
GLUCOSE BLD MANUAL STRIP-MCNC: 108 MG/DL (ref 74–99)
GLUCOSE BLD MANUAL STRIP-MCNC: 297 MG/DL (ref 74–99)
GLUCOSE BLD MANUAL STRIP-MCNC: >600 MG/DL (ref 74–99)
GLUCOSE BLDV-MCNC: 396 MG/DL (ref 74–99)
GLUCOSE BLDV-MCNC: 453 MG/DL (ref 74–99)
GLUCOSE SERPL-MCNC: 151 MG/DL (ref 74–99)
GLUCOSE SERPL-MCNC: 412 MG/DL (ref 74–99)
GLUCOSE SERPL-MCNC: 662 MG/DL (ref 74–99)
HCO3 BLDV-SCNC: 18.8 MMOL/L (ref 22–26)
HCO3 BLDV-SCNC: 23.6 MMOL/L (ref 22–26)
HCT VFR BLD AUTO: 37 % (ref 41–52)
HCT VFR BLD EST: 38 % (ref 41–52)
HCT VFR BLD EST: 40 % (ref 41–52)
HGB BLD-MCNC: 12.6 G/DL (ref 13.5–17.5)
HGB BLDV-MCNC: 12.6 G/DL (ref 13.5–17.5)
HGB BLDV-MCNC: 13.3 G/DL (ref 13.5–17.5)
IMM GRANULOCYTES # BLD AUTO: 0.01 X10*3/UL (ref 0–0.7)
IMM GRANULOCYTES NFR BLD AUTO: 0.2 % (ref 0–0.9)
INHALED O2 CONCENTRATION: 21 %
INHALED O2 CONCENTRATION: 21 %
LACTATE BLDV-SCNC: 1.7 MMOL/L (ref 0.4–2)
LACTATE BLDV-SCNC: 1.7 MMOL/L (ref 0.4–2)
LYMPHOCYTES # BLD AUTO: 0.87 X10*3/UL (ref 1.2–4.8)
LYMPHOCYTES NFR BLD AUTO: 14.2 %
MCH RBC QN AUTO: 28.8 PG (ref 26–34)
MCHC RBC AUTO-ENTMCNC: 34.1 G/DL (ref 32–36)
MCV RBC AUTO: 85 FL (ref 80–100)
METHADONE UR QL SCN: NORMAL
MONOCYTES # BLD AUTO: 0.28 X10*3/UL (ref 0.1–1)
MONOCYTES NFR BLD AUTO: 4.6 %
NEUTROPHILS # BLD AUTO: 4.92 X10*3/UL (ref 1.2–7.7)
NEUTROPHILS NFR BLD AUTO: 80.3 %
NRBC BLD-RTO: 0 /100 WBCS (ref 0–0)
OPIATES UR QL SCN: NORMAL
OXYCODONE+OXYMORPHONE UR QL SCN: NORMAL
OXYHGB MFR BLDV: 27.5 % (ref 45–75)
OXYHGB MFR BLDV: 37 % (ref 45–75)
PCO2 BLDV: 40 MM HG (ref 41–51)
PCO2 BLDV: 49 MM HG (ref 41–51)
PCP UR QL SCN: NORMAL
PH BLDV: 7.28 PH (ref 7.33–7.43)
PH BLDV: 7.29 PH (ref 7.33–7.43)
PLATELET # BLD AUTO: 362 X10*3/UL (ref 150–450)
PO2 BLDV: 27 MM HG (ref 35–45)
PO2 BLDV: 30 MM HG (ref 35–45)
POTASSIUM BLDV-SCNC: 4.8 MMOL/L (ref 3.5–5.3)
POTASSIUM BLDV-SCNC: 5.9 MMOL/L (ref 3.5–5.3)
POTASSIUM SERPL-SCNC: 4.3 MMOL/L (ref 3.5–5.3)
POTASSIUM SERPL-SCNC: 4.8 MMOL/L (ref 3.5–5.3)
POTASSIUM SERPL-SCNC: 5.7 MMOL/L (ref 3.5–5.3)
PROT SERPL-MCNC: 7.5 G/DL (ref 6.4–8.2)
RBC # BLD AUTO: 4.37 X10*6/UL (ref 4.5–5.9)
SAO2 % BLDV: 28 % (ref 45–75)
SAO2 % BLDV: 38 % (ref 45–75)
SODIUM BLDV-SCNC: 127 MMOL/L (ref 136–145)
SODIUM BLDV-SCNC: 129 MMOL/L (ref 136–145)
SODIUM SERPL-SCNC: 128 MMOL/L (ref 136–145)
SODIUM SERPL-SCNC: 132 MMOL/L (ref 136–145)
SODIUM SERPL-SCNC: 136 MMOL/L (ref 136–145)
WBC # BLD AUTO: 6.1 X10*3/UL (ref 4.4–11.3)

## 2024-05-07 PROCEDURE — 85025 COMPLETE CBC W/AUTO DIFF WBC: CPT | Performed by: GENERAL PRACTICE

## 2024-05-07 PROCEDURE — 2500000002 HC RX 250 W HCPCS SELF ADMINISTERED DRUGS (ALT 637 FOR MEDICARE OP, ALT 636 FOR OP/ED): Performed by: PHARMACIST

## 2024-05-07 PROCEDURE — 96374 THER/PROPH/DIAG INJ IV PUSH: CPT

## 2024-05-07 PROCEDURE — 84132 ASSAY OF SERUM POTASSIUM: CPT | Mod: 59 | Performed by: GENERAL PRACTICE

## 2024-05-07 PROCEDURE — 84132 ASSAY OF SERUM POTASSIUM: CPT | Performed by: GENERAL PRACTICE

## 2024-05-07 PROCEDURE — 96361 HYDRATE IV INFUSION ADD-ON: CPT

## 2024-05-07 PROCEDURE — 82947 ASSAY GLUCOSE BLOOD QUANT: CPT

## 2024-05-07 PROCEDURE — 80307 DRUG TEST PRSMV CHEM ANLYZR: CPT | Performed by: GENERAL PRACTICE

## 2024-05-07 PROCEDURE — 36415 COLL VENOUS BLD VENIPUNCTURE: CPT | Performed by: GENERAL PRACTICE

## 2024-05-07 PROCEDURE — 99291 CRITICAL CARE FIRST HOUR: CPT | Mod: 25

## 2024-05-07 PROCEDURE — 82947 ASSAY GLUCOSE BLOOD QUANT: CPT | Mod: 59

## 2024-05-07 PROCEDURE — 2500000004 HC RX 250 GENERAL PHARMACY W/ HCPCS (ALT 636 FOR OP/ED): Performed by: GENERAL PRACTICE

## 2024-05-07 PROCEDURE — 99291 CRITICAL CARE FIRST HOUR: CPT | Performed by: GENERAL PRACTICE

## 2024-05-07 PROCEDURE — 2500000002 HC RX 250 W HCPCS SELF ADMINISTERED DRUGS (ALT 637 FOR MEDICARE OP, ALT 636 FOR OP/ED): Performed by: GENERAL PRACTICE

## 2024-05-07 PROCEDURE — 99284 EMERGENCY DEPT VISIT MOD MDM: CPT | Mod: 25

## 2024-05-07 RX ORDER — INSULIN LISPRO 100 [IU]/ML
10 INJECTION, SOLUTION INTRAVENOUS; SUBCUTANEOUS ONCE
Status: COMPLETED | OUTPATIENT
Start: 2024-05-07 | End: 2024-05-07

## 2024-05-07 RX ADMIN — SODIUM CHLORIDE, POTASSIUM CHLORIDE, SODIUM LACTATE AND CALCIUM CHLORIDE 1000 ML: 600; 310; 30; 20 INJECTION, SOLUTION INTRAVENOUS at 03:19

## 2024-05-07 RX ADMIN — SODIUM CHLORIDE 1000 ML: 9 INJECTION, SOLUTION INTRAVENOUS at 02:05

## 2024-05-07 RX ADMIN — INSULIN LISPRO 10 UNITS: 100 INJECTION, SOLUTION INTRAVENOUS; SUBCUTANEOUS at 06:25

## 2024-05-07 RX ADMIN — INSULIN HUMAN 15 UNITS: 100 INJECTION, SOLUTION PARENTERAL at 03:19

## 2024-05-07 RX ADMIN — SODIUM CHLORIDE, POTASSIUM CHLORIDE, SODIUM LACTATE AND CALCIUM CHLORIDE 1000 ML: 600; 310; 30; 20 INJECTION, SOLUTION INTRAVENOUS at 06:30

## 2024-05-07 ASSESSMENT — PAIN SCALES - GENERAL
PAINLEVEL_OUTOF10: 0 - NO PAIN

## 2024-05-07 ASSESSMENT — PAIN - FUNCTIONAL ASSESSMENT: PAIN_FUNCTIONAL_ASSESSMENT: 0-10

## 2024-05-07 ASSESSMENT — PAIN DESCRIPTION - PROGRESSION: CLINICAL_PROGRESSION: NOT CHANGED

## 2024-05-07 ASSESSMENT — ACTIVITIES OF DAILY LIVING (ADL): LACK_OF_TRANSPORTATION: NO

## 2024-05-07 NOTE — ED NOTES
Call placed to Erlin Hirsch (mother) multiple times, no answer.     Lady Watkins, RN  05/07/24 1111

## 2024-05-07 NOTE — ED PROVIDER NOTES
HPI   Chief Complaint   Patient presents with    Hyperglycemia       HPI: 38-year-old male with a history of type 2 diabetes presents for hyperglycemia.  He was recently admitted in the ICU at the [facility] for diabetic ketoacidosis.  The patient reportedly has a history of very early onset dementia.  He is unable to articulate the details surrounding what brought him to the ER tonight but he is denying chest pain, shortness of breath and abdominal pain.      Limitations to history: None  Independent Historians: Patient  External Records Reviewed: HIE, outpatient notes, inpatient notes  ------------------------------------------------------------------------------------------------------------------------------------------  ROS: a ten point review of systems was performed and was negative except as per HPI.  ------------------------------------------------------------------------------------------------------------------------------------------  PMH / PSH: as per HPI, otherwise reviewed in EMR  MEDS: as per HPI, otherwise reviewed in EMR  ALLERGIES: as per HPI, otherwise reviewed in EMR  SocH:  as per HPI, otherwise reviewed in EMR  FH:  as per HPI, otherwise reviewed in EMR  ------------------------------------------------------------------------------------------------------------------------------------------  Physical Exam:  VS: As documented in the triage note and EMR flowsheet from this visit was reviewed  General: Well appearing. No acute distress.   Eyes:  Extraocular movements grossly intact. No scleral icterus. No discharge  HEENT:  Normocephalic.  Atraumatic  Neck: Moves neck freely. No gross masses  CV: Regular rhythm. No murmurs, rubs or gallops   Resp: Clear to auscultation bilaterally. No respiratory distress.    GI: Soft, no masses, nontender. No rebound tenderness or guarding  MSK: Symmetric muscle bulk. No deformities. No lower extremity edema.    Skin: Warm, dry, intact.   Neuro: No focal  deficits.  A&O to self only  Psych: Patient is confused but is alert to voice and responsive  ------------------------------------------------------------------------------------------------------------------------------------------  Hospital Course / Medical Decision Making:  Independent Interpretations: NA  EKG as interpreted by me: N/A    MDM: 38-year-old male with a history of type 2 diabetes presents for hyperglycemia.  He is found to be in diabetic ketoacidosis on arrival.  Glucose is initially in the 600s.  He was given multiple fluid boluses and insulin.  On reevaluation his anion gap is nearly within normal limits and his acidosis is resolving.  I ordered additional fluids and another insulin bolus.  Patient was signed out to the oncoming provider pending reevaluation and ultimate disposition.    Discussion of Management with Other Providers:   I discussed the patient/results with: Emergency medicine team    Final diagnosis and disposition as below.    Results for orders placed or performed during the hospital encounter of 05/07/24  -CBC and Auto Differential:        Result                      Value             Ref Range           WBC                         6.1               4.4 - 11.3 x*       nRBC                        0.0               0.0 - 0.0 /1*       RBC                         4.37 (L)          4.50 - 5.90 *       Hemoglobin                  12.6 (L)          13.5 - 17.5 *       Hematocrit                  37.0 (L)          41.0 - 52.0 %       MCV                         85                80 - 100 fL         MCH                         28.8              26.0 - 34.0 *       MCHC                        34.1              32.0 - 36.0 *       RDW                         13.2              11.5 - 14.5 %       Platelets                   362               150 - 450 x1*       Neutrophils %               80.3              40.0 - 80.0 %       Immature Granulocytes *     0.2               0.0 - 0.9 %          Lymphocytes %               14.2              13.0 - 44.0 %       Monocytes %                 4.6               2.0 - 10.0 %        Eosinophils %               0.2               0.0 - 6.0 %         Basophils %                 0.5               0.0 - 2.0 %         Neutrophils Absolute        4.92              1.20 - 7.70 *       Immature Granulocytes *     0.01              0.00 - 0.70 *       Lymphocytes Absolute        0.87 (L)          1.20 - 4.80 *       Monocytes Absolute          0.28              0.10 - 1.00 *       Eosinophils Absolute        0.01              0.00 - 0.70 *       Basophils Absolute          0.03              0.00 - 0.10 *  -Comprehensive metabolic panel:        Result                      Value             Ref Range           Glucose                     662 (HH)          74 - 99 mg/dL       Sodium                      128 (L)           136 - 145 mm*       Potassium                   5.7 (H)           3.5 - 5.3 mm*       Chloride                    90 (L)            98 - 107 mmo*       Bicarbonate                 22                21 - 32 mmol*       Anion Gap                   22 (H)            10 - 20 mmol*       Urea Nitrogen               21                6 - 23 mg/dL        Creatinine                  1.30              0.50 - 1.30 *       eGFR                        71                >60 mL/min/1*       Calcium                     9.8               8.6 - 10.3 m*       Albumin                     4.5               3.4 - 5.0 g/*       Alkaline Phosphatase        91                33 - 120 U/L        Total Protein               7.5               6.4 - 8.2 g/*       AST                         10                9 - 39 U/L          Bilirubin, Total            0.5               0.0 - 1.2 mg*       ALT                         18                10 - 52 U/L    -BLOOD GAS VENOUS FULL PANEL:        Result                      Value             Ref Range           POCT pH, Venous             7.29  (L)          7.33 - 7.43 *       POCT pCO2, Venous           49                41 - 51 mm Hg       POCT pO2, Venous            27 (L)            35 - 45 mm Hg       POCT SO2, Venous            28 (L)            45 - 75 %           POCT Oxy Hemoglobin, V*     27.5 (L)          45.0 - 75.0 %       POCT Hematocrit Calcul*     40.0 (L)          41.0 - 52.0 %       POCT Sodium, Venous         127 (L)           136 - 145 mm*       POCT Potassium, Venous      5.9 (H)           3.5 - 5.3 mm*       POCT Chloride, Venous       92 (L)            98 - 107 mmo*       POCT Ionized Calicum, *     1.32              1.10 - 1.33 *       POCT Glucose, Venous        453 (HH)          74 - 99 mg/dL       POCT Lactate, Venous        1.7               0.4 - 2.0 mm*       POCT Base Excess, Veno*     -3.4 (L)          -2.0 - 3.0 m*       POCT HCO3 Calculated, *     23.6              22.0 - 26.0 *       POCT Hemoglobin, Venous     13.3 (L)          13.5 - 17.5 *       POCT Anion Gap, Venous      17.0              10.0 - 25.0 *       Patient Temperature         37.0              degrees Cels*       FiO2                        21                %              -Drug Screen, Urine:        Result                      Value             Ref Range           Amphetamine Screen, Ur*                       Presumptive *   Presumptive Negative       Barbiturate Screen, Ur*                       Presumptive *   Presumptive Negative       Benzodiazepines Screen*                       Presumptive *   Presumptive Negative       Cannabinoid Screen, Ur*                       Presumptive *   Presumptive Negative       Cocaine Metabolite Scr*                       Presumptive *   Presumptive Negative       Fentanyl Screen, Urine                        Presumptive *   Presumptive Negative       Opiate Screen, Urine                          Presumptive *   Presumptive Negative       Oxycodone Screen, Urine                       Presumptive *   Presumptive  Negative       PCP Screen, Urine                             Presumptive *   Presumptive Negative       Methadone Screen, Urine                       Presumptive *   Presumptive Negative  -Basic metabolic panel:        Result                      Value             Ref Range           Glucose                     412 (H)           74 - 99 mg/dL       Sodium                      132 (L)           136 - 145 mm*       Potassium                   4.8               3.5 - 5.3 mm*       Chloride                    99                98 - 107 mmo*       Bicarbonate                 18 (L)            21 - 32 mmol*       Anion Gap                   20                10 - 20 mmol*       Urea Nitrogen               18                6 - 23 mg/dL        Creatinine                  1.03              0.50 - 1.30 *       eGFR                        >90               >60 mL/min/1*       Calcium                     9.0               8.6 - 10.3 m*  -BLOOD GAS VENOUS FULL PANEL:        Result                      Value             Ref Range           POCT pH, Venous             7.28 (L)          7.33 - 7.43 *       POCT pCO2, Venous           40 (L)            41 - 51 mm Hg       POCT pO2, Venous            30 (L)            35 - 45 mm Hg       POCT SO2, Venous            38 (L)            45 - 75 %           POCT Oxy Hemoglobin, V*     37.0 (L)          45.0 - 75.0 %       POCT Hematocrit Calcul*     38.0 (L)          41.0 - 52.0 %       POCT Sodium, Venous         129 (L)           136 - 145 mm*       POCT Potassium, Venous      4.8               3.5 - 5.3 mm*       POCT Chloride, Venous       100               98 - 107 mmo*       POCT Ionized Calicum, *     1.31              1.10 - 1.33 *       POCT Glucose, Venous        396 (H)           74 - 99 mg/dL       POCT Lactate, Venous        1.7               0.4 - 2.0 mm*       POCT Base Excess, Veno*     -7.5 (L)          -2.0 - 3.0 m*       POCT HCO3 Calculated, *     18.8 (L)           22.0 - 26.0 *       POCT Hemoglobin, Venous     12.6 (L)          13.5 - 17.5 *       POCT Anion Gap, Venous      15.0              10.0 - 25.0 *       Patient Temperature         37.0              degrees Cels*       FiO2                        21                %              -POCT GLUCOSE:        Result                      Value             Ref Range           POCT Glucose                >600 (H)          74 - 99 mg/dL  -POCT GLUCOSE:        Result                      Value             Ref Range           POCT Glucose                297 (H)           74 - 99 mg/dL  No orders to display                            No data recorded                   Patient History   History reviewed. No pertinent past medical history.  Past Surgical History:   Procedure Laterality Date    OTHER SURGICAL HISTORY  12/02/2022    No history of surgery     Family History   Problem Relation Name Age of Onset    Hypertension Sister      Diabetes Maternal Grandmother      Hypertension Maternal Grandmother      Cancer Other       Social History     Tobacco Use    Smoking status: Never    Smokeless tobacco: Never   Substance Use Topics    Alcohol use: Not Currently    Drug use: Never       Physical Exam   ED Triage Vitals [05/07/24 0012]   Temperature Heart Rate Respirations BP   36.7 °C (98 °F) 88 18 123/79      Pulse Ox Temp Source Heart Rate Source Patient Position   100 % Temporal Monitor Sitting      BP Location FiO2 (%)     Left arm --       Physical Exam    ED Course & MDM   Diagnoses as of 05/07/24 0709   Diabetic ketoacidosis without coma associated with diabetes mellitus due to underlying condition (Multi)       Medical Decision Making      Procedure  Critical Care    Performed by: Tarun Rolle DO  Authorized by: Tarun Rolle DO    Critical care provider statement:     Critical care time (minutes):  45    Critical care time was exclusive of:  Separately billable procedures and treating other patients    Critical care was  necessary to treat or prevent imminent or life-threatening deterioration of the following conditions:  Endocrine crisis    Critical care was time spent personally by me on the following activities:  Evaluation of patient's response to treatment, re-evaluation of patient's condition, ordering and review of laboratory studies and examination of patient       Tarun Rolle DO  05/13/24 0117

## 2024-05-07 NOTE — PROGRESS NOTES
@ 1200 updated Sister that patient is cleared for DC. Plan for patient's brother to pick patient up.     05/07/24 1201   Discharge Planning   Living Arrangements Parent   Support Systems Family members   Assistance Needed transport home with brother   Type of Residence Private residence   Home or Post Acute Services Community services   Patient expects to be discharged to: Home with mother   Does the patient need discharge transport arranged? No   Financial Resource Strain   How hard is it for you to pay for the very basics like food, housing, medical care, and heating? Not hard   Housing Stability   In the last 12 months, was there a time when you were not able to pay the mortgage or rent on time? N   In the last 12 months, how many places have you lived? 1   In the last 12 months, was there a time when you did not have a steady place to sleep or slept in a shelter (including now)? N   Transportation Needs   In the past 12 months, has lack of transportation kept you from medical appointments or from getting medications? no   In the past 12 months, has lack of transportation kept you from meetings, work, or from getting things needed for daily living? No

## 2024-05-07 NOTE — ED PROVIDER NOTES
The patient's case was signed out to me by the outgoing physician.  Per their instructions the patient was given IV hydration as well as insulin.  I reviewed the patient's case the patient's blood glucose was checked again this was found to be 108.  I explained the findings to the patient's family the patient was then discharged in stable condition in the custody of his family.     Zachariah Capps MD  05/10/24 5709

## 2024-05-07 NOTE — ED NOTES
Call placed 3x to patient sister for transportation, no answer, voicemail was left.     Lady Watkins RN  05/07/24 0906

## 2024-05-10 DIAGNOSIS — R25.2 LEG CRAMPS: ICD-10-CM

## 2024-05-13 ENCOUNTER — APPOINTMENT (OUTPATIENT)
Dept: PRIMARY CARE | Facility: CLINIC | Age: 39
End: 2024-05-13
Payer: COMMERCIAL

## 2024-05-14 ENCOUNTER — OFFICE VISIT (OUTPATIENT)
Dept: PRIMARY CARE | Facility: CLINIC | Age: 39
End: 2024-05-14
Payer: COMMERCIAL

## 2024-05-14 VITALS
RESPIRATION RATE: 12 BRPM | WEIGHT: 161 LBS | OXYGEN SATURATION: 96 % | SYSTOLIC BLOOD PRESSURE: 94 MMHG | DIASTOLIC BLOOD PRESSURE: 67 MMHG | HEART RATE: 108 BPM | BODY MASS INDEX: 21.84 KG/M2

## 2024-05-14 DIAGNOSIS — E11.69 TYPE 2 DIABETES MELLITUS WITH OTHER SPECIFIED COMPLICATION, UNSPECIFIED WHETHER LONG TERM INSULIN USE (MULTI): ICD-10-CM

## 2024-05-14 DIAGNOSIS — E13.10 DIABETIC KETOACIDOSIS WITHOUT COMA ASSOCIATED WITH OTHER SPECIFIED DIABETES MELLITUS (MULTI): Primary | ICD-10-CM

## 2024-05-14 PROBLEM — E87.20 METABOLIC ACIDOSIS: Status: ACTIVE | Noted: 2024-04-27

## 2024-05-14 PROBLEM — Z91.199 NON-COMPLIANCE: Status: ACTIVE | Noted: 2024-04-10

## 2024-05-14 LAB — POC FINGERSTICK BLOOD GLUCOSE: 133 MG/DL (ref 70–100)

## 2024-05-14 PROCEDURE — 82962 GLUCOSE BLOOD TEST: CPT | Performed by: INTERNAL MEDICINE

## 2024-05-14 PROCEDURE — 3078F DIAST BP <80 MM HG: CPT | Performed by: INTERNAL MEDICINE

## 2024-05-14 PROCEDURE — 99214 OFFICE O/P EST MOD 30 MIN: CPT | Performed by: INTERNAL MEDICINE

## 2024-05-14 PROCEDURE — 3074F SYST BP LT 130 MM HG: CPT | Performed by: INTERNAL MEDICINE

## 2024-05-14 PROCEDURE — 1036F TOBACCO NON-USER: CPT | Performed by: INTERNAL MEDICINE

## 2024-05-14 RX ORDER — PEN NEEDLE, DIABETIC 30 GX3/16"
NEEDLE, DISPOSABLE MISCELLANEOUS
Qty: 200 EACH | Refills: 2 | Status: SHIPPED | OUTPATIENT
Start: 2024-05-14

## 2024-05-14 RX ORDER — HYDROXYZINE HYDROCHLORIDE 10 MG/1
20 TABLET, FILM COATED ORAL EVERY 6 HOURS PRN
COMMUNITY
Start: 2024-04-12

## 2024-05-14 RX ORDER — LANOLIN ALCOHOL/MO/W.PET/CERES
CREAM (GRAM) TOPICAL
Qty: 45 TABLET | Refills: 7 | Status: SHIPPED | OUTPATIENT
Start: 2024-05-14

## 2024-05-14 NOTE — ASSESSMENT & PLAN NOTE
Continue 70-30 insulin    New sliding scale   <180 = none  180-240 = 4u  241-300 = 6u  301-360 = 8u  361-400 = 10u  >400 = 12u

## 2024-05-14 NOTE — PROGRESS NOTES
"Subjective   Chief complaint: Armand Hirsch is a 38 y.o. male who presents for Hospital Follow-up.    HPI:  38 yr old male following up after back to back hospitalizations for DKA. Pt presents with his brother, who states he has not been consistent with his medications. He says his blood sugar readings have been sporadic, with random increases and drops. He would like help managing and preventing reoccurrence.         Objective   BP 94/67   Pulse 108   Resp 12   Wt 73 kg (161 lb)   SpO2 96%   BMI 21.84 kg/m²   Physical Exam  Constitutional:       General: He is not in acute distress.  Eyes:      Extraocular Movements: Extraocular movements intact.   Pulmonary:      Effort: Pulmonary effort is normal.   Musculoskeletal:      Cervical back: Neck supple.   Neurological:      Mental Status: He is alert.   Psychiatric:         Mood and Affect: Mood normal.         Behavior: Behavior is cooperative.         I have reviewed and reconciled the medication list with the patient today.   Current Outpatient Medications:     amLODIPine (Norvasc) 10 mg tablet, Take 1 tablet (10 mg) by mouth once daily., Disp: 90 tablet, Rfl: 3    atorvastatin (Lipitor) 20 mg tablet, Take 1 tablet (20 mg) by mouth once daily., Disp: 90 tablet, Rfl: 3    gabapentin (Neurontin) 100 mg capsule, TAKE 1 CAPSULE BY MOUTH THREE TIMES A DAY, Disp: 90 capsule, Rfl: 2    hydrOXYzine HCL (Atarax) 10 mg tablet, Take 2 tablets (20 mg) by mouth every 6 hours if needed for itching or allergies., Disp: , Rfl:     insulin lispro (HumaLOG) 100 unit/mL injection, Inject 18 Units under the skin 3 times a day with meals., Disp: 10 each, Rfl: 3    insulin NPH and regular human (HumuLIN 70/30 U-100 Insulin) 100 unit/mL (70-30) injection, INJECT 30 UNITS DAILY WITH BREAKFAST AND INJECT 15 UNITS DAILY WITH DINNER, Disp: 10 mL, Rfl: 3    insulin syringe-needle U-100 (BD Insulin Syringe Ultra-Fine) 30G X 1/2\" 0.5 mL syringe, USE AS DIRECTED 4 TIMES DAILY, Disp: 100 " "each, Rfl: 11    magnesium oxide (Mag-Ox) 400 mg (241.3 mg magnesium) tablet, TAKE 2 TABLETS (800 MG) BY MOUTH ONCE DAILY., Disp: 45 tablet, Rfl: 7    pen needle, diabetic (BD Ultra-Fine Mini Pen Needle) 31 gauge x 3/16\" needle, INJECT 3 TIMES DAILY, Disp: 200 each, Rfl: 2    sildenafil (Viagra) 50 mg tablet, Take 1 tablet (50 mg) by mouth once daily as needed for erectile dysfunction., Disp: 12 tablet, Rfl: 11    alcohol swabs pads, medicated, Apply 1 each topically once daily as needed (Use when injecting Insulin and testing blood sugar)., Disp: 90 each, Rfl: 3    ergocalciferol (Vitamin D-2) 1.25 MG (46491 UT) capsule, Take 1 capsule (1,250 mcg) by mouth 1 (one) time per week., Disp: 12 capsule, Rfl: 3    FreeStyle Dennise 2 Hudson misc, , Disp: , Rfl:     FreeStyle Dennise sensor system (FreeStyle Dennise 2 Sensor) kit, USE AS DIRECTED, Disp: 6 each, Rfl: 11     Imaging:  CT head wo IV contrast    Result Date: 5/5/2024  * * *Final Report* * * DATE OF EXAM: May  5 2024  8:39PM   [facility]   0504  -  CT BRAIN WO IVCON  / ACCESSION #  [id number] PROCEDURE REASON: Mental status change, unknown cause      * * * * Physician Interpretation * * * *  EXAMINATION:  CT BRAIN WITHOUT CONTRAST CLINICAL HISTORY:  Mental status change, unknown cause TECHNIQUE:  Routine CT scan of the brain without contrast.  Serial axial unenhanced images were obtained from the  vertex to the foramen magnum. CT Dose-Length Product (DLP): 778  mGy*cm CT Dose Reduction Employed: No dose reduction techniques were required COMPARISON:  CT head 04/30/2024. RESULT: Hemorrhage:    No evidence of acute intracranial hemorrhage. Mass Effect / Mass Lesion:    There is no evidence of an intracranial mass or extraaxial fluid collection.  No significant mass effect. Chronic change:    Focal area of low-attenuation in the left parietal region compatible with remote infarct is unchanged. Parenchyma:  There is no significant volume loss. Ventricles:     Mild prominence of " the lateral and third ventricles unchanged from 04/30/2024. Other:  The visualized paranasal sinuses are grossly clear.  The skull and visualized extracranial soft tissues are grossly normal.    IMPRESSION: No CT evidence of acute intracranial abnormality. Mild prominence of the lateral and third ventricles unchanged from 04/30/2024, but increased from 02/04/2024.  As discussed on the exam performed 04/30/2024 the findings may represent developing normal pressure hydrocephalus.  Follow-up as clinically warranted. : [facility]   Transcribe Date/Time: May  5 2024  9:11P Dictated by : TRIXIE RIZO MD This examination was interpreted and the report reviewed and electronically signed by: TRIXIE RIZO MD on May  5 2024  9:20PM  EST    XR chest 2 views    Result Date: 5/5/2024  * * *Final Report* * * DATE OF EXAM: May  5 2024  6:58PM   [facility]   5291  -  XR CHEST 2V FRONTAL/LAT  / ACCESSION #  [id number] PROCEDURE REASON: Cough      * * * * Physician Interpretation * * * *  EXAM:  XR CHEST 2V FRONTAL/LAT CLINICAL HISTORY: Cough Cough COMPARISON: 4/30/2024 RESULT: Lines, tubes, and devices:  None. Lungs and pleura: Bilateral lung fields are clear. No pneumothorax or suggestion for obvious/significant pleural effusions. Cardiomediastinal silhouette:  Normal cardiomediastinal silhouette.    IMPRESSION: No acute cardiopulmonary process. : [facility]   Transcribe Date/Time: May  5 2024  7:21P Dictated by : RAMIRO BAIRD MD This examination was interpreted and the report reviewed and electronically signed by: RAMIRO BAIRD MD on May  5 2024  7:21PM  EST    CT head wo IV contrast    Result Date: 4/30/2024  * * *Final Report* * * DATE OF EXAM: Apr 30 2024  8:12PM   [facility]   0504  -  CT BRAIN WO IVCON  / ACCESSION #  [id number] PROCEDURE REASON: Mental status change, unknown cause      * * * * Physician Interpretation * * * *  EXAM: CT HEAD WITHOUT CONTRAST CLINICAL HISTORY: Mental status change, unknown cause,  hyperglycemia, AMS TECHNIQUE:  Routine axial images from skull base through vertex without contrast. CT Dose-Length Product (DLP): 878  mGy*cm CT Dose Reduction Employed: None required. COMPARISON: 4/6/2024, 2/18/2024, 2/4/2024 and 9/27/2022 head CT scans. 10/22/2022 brain MRI RESULT: Acute change: No acute intracranial hemorrhage or mass effect. Parenchyma:  No focal brain pathology. Ventricles:  Mild dilation of the third and lateral ventricles including prominence of the bilateral temporal horns. Fourth ventricle also mildly prominent. Extra-axial spaces:  There are no extra-axial masses or fluid collections. Mild prominence of the sylvian fissures and cerebral sulci is similar to prior exams. Bone/soft tissues:  Included bony structures are within normal limits. Sinuses: Included paranasal sinuses are clear.    IMPRESSION: No acute intracranial pathology. Mild dilation of lateral, third and fourth ventricles which is similar to 4/6/2024 and 2/18/2024 but increased since 2/4/2024. Findings on serial exams could represent developing normal pressure hydrocephalus. Consider neurologic workup and brain MRI with CSF flow studies if clinically appropriate. ACTIONABLE RESULT: FOLLOW-UP Acuity: Actionable Findings: Neurological System-BRAIN Routing Code:  NI_1 Recommendation: Unlisted Recommendation (see report) Time Frame: At the discretion of the clinical team. COMMUNICATION: Results will be communicated with the ordering provider via Epic staff message or phone message by Imaging Support Services within 2 business days of report finalization. --END OF FINDING-- ==================================== Algorithms for management of incidental imaging findings can be found on the [facility] Intranet Sharepoint site at: [email or web address]/[email or web address] [email or web address] : ZOILA   Transcribe Date/Time: Apr 30 2024  8:21P Dictated by : JACOB  MD DANNY This examination was interpreted and the report reviewed and electronically signed by: JACOB DELGADO MD on Apr 30 2024  8:41PM  EST    XR chest 1 view    Result Date: 4/30/2024  * * *Final Report* * * DATE OF EXAM: Apr 30 2024  7:34PM   [facility]   5376  -  XR CHEST 1V FRONTAL PORT  / ACCESSION #  [id number] PROCEDURE REASON: Other      * * * * Physician Interpretation * * * *  EXAMINATION:  CHEST RADIOGRAPH (PORTABLE SINGLE VIEW AP) Exam Date/Time:  4/30/2024 7:34 PM CLINICAL HISTORY: Other, hyperglycemia MQ:  XCPR_5 Comparison:  04/06/2024. RESULT: Lines, tubes, and devices:  None. Lungs and pleura:  There is hypoinflation of the lungs with crowded lung markings in both lung bases.  The right apex is obscured by the patient's head.  The pleural margins appear unremarkable. Cardiomediastinal silhouette:  Stable and unremarkable cardiomediastinal silhouette. Other:  The visualized bony thorax appears unremarkable.    IMPRESSION: Overall unremarkable exam with no definite acute disease. : [facility]   Transcribe Date/Time: Apr 30 2024  7:45P Dictated by : JAIMIE AUSTIN MD This examination was interpreted and the report reviewed and electronically signed by: JAIMIE AUSTIN MD on Apr 30 2024  7:45PM  EST       Labs reviewed:    Lab Results   Component Value Date    WBC 6.1 05/07/2024    HGB 12.6 (L) 05/07/2024    HCT 37.0 (L) 05/07/2024     05/07/2024    CHOL 201 (H) 12/22/2023    TRIG 124 12/22/2023    HDL 62.1 12/22/2023    ALT 18 05/07/2024    AST 10 05/07/2024     05/07/2024    K 4.3 05/07/2024     05/07/2024    CREATININE 0.87 05/07/2024    BUN 15 05/07/2024    CO2 24 05/07/2024    TSH 1.37 12/22/2023    INR 0.8 (L) 01/24/2023    HGBA1C 11.4 (H) 02/19/2024       Assessment/Plan   Problem List Items Addressed This Visit       Diabetes (Multi)     Continue 70-30 insulin    New sliding scale   <180 = none  180-240 = 4u  241-300 = 6u  301-360 = 8u  361-400 = 10u  >400 = 12u           Relevant Orders    POCT fingerstick glucose manually resulted (Completed)    Diabetic ketoacidosis (Multi) - Primary     Counseled pt on importance of taking insulin consistently to avoid DKA              Continue current medications as listed  Follow up in 3 months

## 2024-05-15 ENCOUNTER — PATIENT OUTREACH (OUTPATIENT)
Dept: CARE COORDINATION | Facility: CLINIC | Age: 39
End: 2024-05-15
Payer: COMMERCIAL

## 2024-05-15 NOTE — PROGRESS NOTES
Call regarding appt. with PCP on  05/14/24 after hospitalization.  At time of outreach call the patient feels as if their condition has improved  since last visit.  Reviewed the PCP appointment with the pt and addressed any questions or concerns.

## 2024-05-30 ENCOUNTER — PATIENT OUTREACH (OUTPATIENT)
Dept: CARE COORDINATION | Facility: CLINIC | Age: 39
End: 2024-05-30
Payer: COMMERCIAL

## 2024-05-30 NOTE — PROGRESS NOTES
Successful outreach to patient regarding hospitalization as patient continues TCM program.   At time of outreach call the patient feels as if their condition has improved since initial visit with PCP or specialist.He had stated that his blood sugars seem to drop pretty low in the middle of the night like in the 50s I advised him to call Dr Echeverria office to advise.  Questions or concerns addressed at this time with patient.   Provided contact information to patient if any further non-emergent needs arise.

## 2024-06-12 ENCOUNTER — APPOINTMENT (OUTPATIENT)
Dept: PRIMARY CARE | Facility: CLINIC | Age: 39
End: 2024-06-12
Payer: COMMERCIAL

## 2024-06-17 ENCOUNTER — APPOINTMENT (OUTPATIENT)
Dept: PRIMARY CARE | Facility: CLINIC | Age: 39
End: 2024-06-17
Payer: COMMERCIAL

## 2024-06-18 ENCOUNTER — APPOINTMENT (OUTPATIENT)
Dept: PRIMARY CARE | Facility: CLINIC | Age: 39
End: 2024-06-18
Payer: COMMERCIAL

## 2024-06-20 ENCOUNTER — APPOINTMENT (OUTPATIENT)
Dept: PRIMARY CARE | Facility: CLINIC | Age: 39
End: 2024-06-20
Payer: COMMERCIAL

## 2024-06-20 ENCOUNTER — LAB (OUTPATIENT)
Dept: LAB | Facility: LAB | Age: 39
End: 2024-06-20
Payer: COMMERCIAL

## 2024-06-20 DIAGNOSIS — Z00.00 ENCOUNTER FOR ANNUAL PHYSICAL EXAM: ICD-10-CM

## 2024-06-20 DIAGNOSIS — N17.9 AKI (ACUTE KIDNEY INJURY) (CMS-HCC): ICD-10-CM

## 2024-06-20 DIAGNOSIS — D50.9 IRON DEFICIENCY ANEMIA, UNSPECIFIED IRON DEFICIENCY ANEMIA TYPE: ICD-10-CM

## 2024-06-20 DIAGNOSIS — E11.69 TYPE 2 DIABETES MELLITUS WITH OTHER SPECIFIED COMPLICATION, UNSPECIFIED WHETHER LONG TERM INSULIN USE (MULTI): ICD-10-CM

## 2024-06-20 DIAGNOSIS — E55.9 VITAMIN D DEFICIENCY, UNSPECIFIED: ICD-10-CM

## 2024-06-20 DIAGNOSIS — E87.5 HYPERKALEMIA: ICD-10-CM

## 2024-06-20 DIAGNOSIS — E03.9 HYPOTHYROIDISM, UNSPECIFIED TYPE: ICD-10-CM

## 2024-06-20 DIAGNOSIS — I10 BENIGN ESSENTIAL HYPERTENSION: ICD-10-CM

## 2024-06-20 DIAGNOSIS — E78.00 ELEVATED LDL CHOLESTEROL LEVEL: ICD-10-CM

## 2024-06-20 DIAGNOSIS — R59.0 HILAR LYMPHADENOPATHY: ICD-10-CM

## 2024-06-20 DIAGNOSIS — Z12.5 SCREENING PSA (PROSTATE SPECIFIC ANTIGEN): ICD-10-CM

## 2024-06-20 DIAGNOSIS — I10 PRIMARY HYPERTENSION: ICD-10-CM

## 2024-06-20 DIAGNOSIS — E78.5 HYPERLIPIDEMIA, UNSPECIFIED HYPERLIPIDEMIA TYPE: ICD-10-CM

## 2024-06-20 LAB
ERYTHROCYTE [DISTWIDTH] IN BLOOD BY AUTOMATED COUNT: 12.9 % (ref 11.5–14.5)
EST. AVERAGE GLUCOSE BLD GHB EST-MCNC: 266 MG/DL
HBA1C MFR BLD: 10.9 %
HCT VFR BLD AUTO: 39.5 % (ref 41–52)
HGB BLD-MCNC: 13.5 G/DL (ref 13.5–17.5)
MCH RBC QN AUTO: 27.8 PG (ref 26–34)
MCHC RBC AUTO-ENTMCNC: 34.2 G/DL (ref 32–36)
MCV RBC AUTO: 81 FL (ref 80–100)
NRBC BLD-RTO: 0 /100 WBCS (ref 0–0)
PLATELET # BLD AUTO: 319 X10*3/UL (ref 150–450)
PSA SERPL-MCNC: 0.46 NG/ML
RBC # BLD AUTO: 4.86 X10*6/UL (ref 4.5–5.9)
WBC # BLD AUTO: 6.5 X10*3/UL (ref 4.4–11.3)

## 2024-06-20 PROCEDURE — 83036 HEMOGLOBIN GLYCOSYLATED A1C: CPT

## 2024-06-20 PROCEDURE — 84443 ASSAY THYROID STIM HORMONE: CPT

## 2024-06-20 PROCEDURE — 84153 ASSAY OF PSA TOTAL: CPT

## 2024-06-20 PROCEDURE — 82306 VITAMIN D 25 HYDROXY: CPT

## 2024-06-20 PROCEDURE — 80053 COMPREHEN METABOLIC PANEL: CPT

## 2024-06-20 PROCEDURE — 80061 LIPID PANEL: CPT

## 2024-06-20 PROCEDURE — 36415 COLL VENOUS BLD VENIPUNCTURE: CPT

## 2024-06-20 PROCEDURE — 85027 COMPLETE CBC AUTOMATED: CPT

## 2024-06-21 LAB
25(OH)D3 SERPL-MCNC: 29 NG/ML (ref 30–100)
ALBUMIN SERPL BCP-MCNC: 4.2 G/DL (ref 3.4–5)
ALP SERPL-CCNC: 104 U/L (ref 33–120)
ALT SERPL W P-5'-P-CCNC: 11 U/L (ref 10–52)
ANION GAP SERPL CALC-SCNC: 16 MMOL/L (ref 10–20)
AST SERPL W P-5'-P-CCNC: 11 U/L (ref 9–39)
BILIRUB SERPL-MCNC: 0.4 MG/DL (ref 0–1.2)
BUN SERPL-MCNC: 10 MG/DL (ref 6–23)
CALCIUM SERPL-MCNC: 9.7 MG/DL (ref 8.6–10.6)
CHLORIDE SERPL-SCNC: 91 MMOL/L (ref 98–107)
CHOLEST SERPL-MCNC: 186 MG/DL (ref 0–199)
CHOLESTEROL/HDL RATIO: 2.7
CO2 SERPL-SCNC: 24 MMOL/L (ref 21–32)
CREAT SERPL-MCNC: 1.15 MG/DL (ref 0.5–1.3)
EGFRCR SERPLBLD CKD-EPI 2021: 83 ML/MIN/1.73M*2
GLUCOSE SERPL-MCNC: 361 MG/DL (ref 74–99)
HDLC SERPL-MCNC: 67.8 MG/DL
LDLC SERPL CALC-MCNC: 95 MG/DL
NON HDL CHOLESTEROL: 118 MG/DL (ref 0–149)
POTASSIUM SERPL-SCNC: 4.1 MMOL/L (ref 3.5–5.3)
PROT SERPL-MCNC: 7.5 G/DL (ref 6.4–8.2)
SODIUM SERPL-SCNC: 127 MMOL/L (ref 136–145)
TRIGL SERPL-MCNC: 114 MG/DL (ref 0–149)
TSH SERPL-ACNC: 1.68 MIU/L (ref 0.44–3.98)
VLDL: 23 MG/DL (ref 0–40)

## 2024-06-25 ENCOUNTER — PATIENT OUTREACH (OUTPATIENT)
Dept: CARE COORDINATION | Facility: CLINIC | Age: 39
End: 2024-06-25
Payer: COMMERCIAL

## 2024-06-25 ENCOUNTER — DOCUMENTATION (OUTPATIENT)
Dept: CARE COORDINATION | Facility: CLINIC | Age: 39
End: 2024-06-25
Payer: COMMERCIAL

## 2024-06-25 NOTE — PROGRESS NOTES
/Discharge Facility: [facility]   Discharge Diagnosis:Hyperglycemia  Admission Date:06/23/24  Discharge Date: 06/25/24    PCP Appointment Date:none made sent to pcp office  Specialist Appointment Date:   Hospital Encounter and Summary: Linked   See discharge assessment below for further details  Two attempts were made to reach patient within two business days after discharge. Voicemail left with contact information for patient to call back with any non-emergent questions or concerns.

## 2024-07-03 ENCOUNTER — APPOINTMENT (OUTPATIENT)
Dept: PRIMARY CARE | Facility: CLINIC | Age: 39
End: 2024-07-03
Payer: COMMERCIAL

## 2024-07-03 PROCEDURE — 99222 1ST HOSP IP/OBS MODERATE 55: CPT | Performed by: INTERNAL MEDICINE

## 2024-07-08 ENCOUNTER — DOCUMENTATION (OUTPATIENT)
Dept: CARE COORDINATION | Facility: CLINIC | Age: 39
End: 2024-07-08
Payer: COMMERCIAL

## 2024-07-08 ENCOUNTER — PATIENT OUTREACH (OUTPATIENT)
Dept: CARE COORDINATION | Facility: CLINIC | Age: 39
End: 2024-07-08
Payer: COMMERCIAL

## 2024-07-08 NOTE — PROGRESS NOTES
Discharge Facility:[facility] Diagnosis:DKA  Admission Date:07/02/24  Discharge Date: 07/07/24    PCP Appointment Date:07/18/24  Specialist Appointment Date:   Hospital Encounter and Summary Linked: Yes  See discharge assessment below for further details  Engagement  Call Start Time: 1021 (7/8/2024 10:21 AM)    Medications  Medications reviewed with patient/caregiver?: Yes (7/8/2024 10:21 AM)  Is the patient having any side effects they believe may be caused by any medication additions or changes?: No (7/8/2024 10:21 AM)  Does the patient have all medications ordered at discharge?: Not applicable (7/8/2024 10:21 AM)    Appointments  Does the patient have a primary care provider?: Yes (7/8/2024 10:21 AM)  Care Management Interventions: Verified appointment date/time/provider (7/8/2024 10:21 AM)    Self Management  Has home health visited the patient within 72 hours of discharge?: Not applicable (7/8/2024 10:21 AM)  Has all Durable Medical Equipment (DME) been delivered?: No (7/8/2024 10:21 AM)    Patient Teaching  Does the patient have access to their discharge instructions?: Yes (7/8/2024 10:21 AM)  Care Management Interventions: Reviewed instructions with patient (7/8/2024 10:21 AM)  What is the patient's perception of their health status since discharge?: Improving (7/8/2024 10:21 AM)    Wrap Up  Call End Time: 1025 (7/8/2024 10:21 AM)

## 2024-07-14 PROCEDURE — 99222 1ST HOSP IP/OBS MODERATE 55: CPT | Performed by: INTERNAL MEDICINE

## 2024-07-15 ENCOUNTER — APPOINTMENT (OUTPATIENT)
Dept: PRIMARY CARE | Facility: CLINIC | Age: 39
End: 2024-07-15
Payer: COMMERCIAL

## 2024-07-15 PROCEDURE — 99232 SBSQ HOSP IP/OBS MODERATE 35: CPT | Performed by: INTERNAL MEDICINE

## 2024-07-16 PROCEDURE — 99238 HOSP IP/OBS DSCHRG MGMT 30/<: CPT | Performed by: INTERNAL MEDICINE

## 2024-07-19 ENCOUNTER — DOCUMENTATION (OUTPATIENT)
Dept: CARE COORDINATION | Facility: CLINIC | Age: 39
End: 2024-07-19
Payer: COMMERCIAL

## 2024-07-19 ENCOUNTER — PATIENT OUTREACH (OUTPATIENT)
Dept: CARE COORDINATION | Facility: CLINIC | Age: 39
End: 2024-07-19
Payer: COMMERCIAL

## 2024-07-19 NOTE — PROGRESS NOTES
Discharge Facility:[facility]  Discharge Diagnosis:DKA Type1  Admission Date:07/13/24  Discharge Date: 07/17/24    PCP Appointment Date:07/25/24  Specialist Appointment Date:   Hospital Encounter and Summary Linked: Yes  See discharge assessment below for further details  Engagement  Call Start Time: 0900 (7/19/2024  8:59 AM)    Medications  Medications reviewed with patient/caregiver?: Yes (Nph insulin) (7/19/2024  8:59 AM)  Is the patient having any side effects they believe may be caused by any medication additions or changes?: No (7/19/2024  8:59 AM)  Does the patient have all medications ordered at discharge?: No (will be getting his meds) (7/19/2024  8:59 AM)  Is the patient taking all medications as directed (includes completed medication regime)?: Yes (7/19/2024  8:59 AM)    Appointments  Does the patient have a primary care provider?: Yes (7/19/2024  8:59 AM)  Care Management Interventions: Verified appointment date/time/provider (7/19/2024  8:59 AM)    Self Management  Has home health visited the patient within 72 hours of discharge?: Not applicable (7/19/2024  8:59 AM)  Has all Durable Medical Equipment (DME) been delivered?: No (7/19/2024  8:59 AM)    Patient Teaching  Does the patient have access to their discharge instructions?: Yes (7/19/2024  8:59 AM)  Care Management Interventions: Reviewed instructions with patient (7/19/2024  8:59 AM)  What is the patient's perception of their health status since discharge?: Improving (7/19/2024  8:59 AM)    Wrap Up  Call End Time: 0910 (7/19/2024  8:59 AM)

## 2024-07-25 ENCOUNTER — APPOINTMENT (OUTPATIENT)
Dept: PRIMARY CARE | Facility: CLINIC | Age: 39
End: 2024-07-25
Payer: COMMERCIAL

## 2024-08-12 ENCOUNTER — NURSING HOME VISIT (OUTPATIENT)
Dept: POST ACUTE CARE | Facility: EXTERNAL LOCATION | Age: 39
End: 2024-08-12
Payer: COMMERCIAL

## 2024-08-12 DIAGNOSIS — E78.5 HYPERLIPIDEMIA, UNSPECIFIED HYPERLIPIDEMIA TYPE: ICD-10-CM

## 2024-08-12 DIAGNOSIS — E13.10 DIABETIC KETOACIDOSIS WITHOUT COMA ASSOCIATED WITH OTHER SPECIFIED DIABETES MELLITUS (MULTI): Primary | ICD-10-CM

## 2024-08-12 DIAGNOSIS — N17.9 AKI (ACUTE KIDNEY INJURY) (CMS-HCC): ICD-10-CM

## 2024-08-12 DIAGNOSIS — I10 HYPERTENSION, UNSPECIFIED TYPE: ICD-10-CM

## 2024-08-12 DIAGNOSIS — R07.9 CHEST PAIN, UNSPECIFIED TYPE: ICD-10-CM

## 2024-08-12 DIAGNOSIS — E13.69 OTHER SPECIFIED DIABETES MELLITUS WITH OTHER SPECIFIED COMPLICATION, UNSPECIFIED WHETHER LONG TERM INSULIN USE (MULTI): ICD-10-CM

## 2024-08-12 PROCEDURE — 99305 1ST NF CARE MODERATE MDM 35: CPT | Performed by: INTERNAL MEDICINE

## 2024-08-12 NOTE — PROGRESS NOTES
HISTORY & PHYSICAL    Subjective   Chief complaint: Armand Hirsch is a 38 y.o. male who is being seen and evaluated for multiple medical problems.  Patient presents for admission to nursing facility.    HPI:  HPI  Patient is a 38-year-old male admitted to nursing facility following hospitalization.  Patient had presented to ED with complaints of chest pain.  Workup did reveal findings for DKA.  Patient's chest pain was worked up as well that revealed negative troponin.  Patient was followed by endocrinology for DKA and patient was transitioned to sliding scale insulin with adjustments in Lantus.  Patient was also noted to have FATOUMATA which was likely due to dehydration and was given IV fluids with improvement.  PT OT evaluated patient recommending SNF for further medical management and therapy.  Patient was seen and examined and appears to be in no acute distress.  Patient denies polydipsia polyuria polyphagia.  Past Medical History:   Diagnosis Date    Diabetes mellitus (Multi)     DKA (diabetic ketoacidosis) (Multi)     Hyperkalemia     Hyperlipidemia     Hypertension     Hyponatremia        Past Surgical History:   Procedure Laterality Date    OTHER SURGICAL HISTORY  12/02/2022    No history of surgery       Family History   Problem Relation Name Age of Onset    No Known Problems Mother      No Known Problems Father      Hypertension Sister      Diabetes Maternal Grandmother      Hypertension Maternal Grandmother      Cancer Other         Social History     Socioeconomic History    Marital status: Single   Tobacco Use    Smoking status: Never    Smokeless tobacco: Never   Substance and Sexual Activity    Alcohol use: Not Currently    Drug use: Never     Social Determinants of Health     Financial Resource Strain: Low Risk  (5/7/2024)    Overall Financial Resource Strain (CARDIA)     Difficulty of Paying Living Expenses: Not hard at all   Food Insecurity: No Food Insecurity (7/26/2024)    Received from [facility]    Hunger Vital Sign     Worried About Running Out of Food in the Last Year: Never true     Ran Out of Food in the Last Year: Never true   Transportation Needs: No Transportation Needs (7/2/2024)    Received from [facility]    PRAPARE - Transportation     Lack of Transportation (Medical): No     Lack of Transportation (Non-Medical): No   Housing Stability: Low Risk  (7/2/2024)    [facility]    Housing Stability Vital Sign     Unable to Pay for Housing in the Last Year: No     Number of Places Lived in the Last Year: 1     In the last 12 months, was there a time when you did not have a steady place to sleep or slept in a shelter (including now)?: No       Vital signs: 140/62, 97.3, 66, 16, 97%, blood sugar 400    Objective   Physical Exam  Constitutional:       General: He is not in acute distress.  Eyes:      Extraocular Movements: Extraocular movements intact.   Cardiovascular:      Rate and Rhythm: Normal rate and regular rhythm.   Pulmonary:      Effort: Pulmonary effort is normal.      Breath sounds: Normal breath sounds.   Abdominal:      General: Bowel sounds are normal.      Palpations: Abdomen is soft.   Musculoskeletal:      Cervical back: Neck supple.      Right lower leg: No edema.      Left lower leg: No edema.   Neurological:      Mental Status: He is alert.   Psychiatric:         Mood and Affect: Mood normal.         Behavior: Behavior is cooperative.         Assessment/Plan   Problem List Items Addressed This Visit       Diabetes (Multi)     Glucoscan-sliding scale insulin  Insulin glargine  Consistent carb\overall diabetic diet         Hypertension     Monitor blood pressure  Amlodipine         FATOUMATA (acute kidney injury) (CMS-HCC)     Improved, received IV fluids  Continue to monitor labs         Diabetic ketoacidosis (Multi) - Primary     Adjustment in diabetic regimen  Endocrine following         Hyperlipidemia     Statin  Monitor  labs         Chest pain     No acute findings.  Monitor          Hospital records reviewed  Medications, treatments, and labs reviewed  Continue medications and treatments as listed in EMR  Discussed with nursing and therapy      Scribe Attestation  I, Kei Buckner   attest that this documentation has been prepared under the direction and in the presence of Mary Ann Beckwith MD    Provider Attestation - Scribe documentation  All medical record entries made by the Scribe were at my direction and personally dictated by me. I have reviewed the chart and agree that the record accurately reflects my personal performance of the history, physical exam, discussion and plan.   Mary Ann Beckwith MD

## 2024-08-12 NOTE — LETTER
Patient: Armand Hirsch  : 1985    Encounter Date: 2024    HISTORY & PHYSICAL    Subjective  Chief complaint: Armand Hirsch is a 38 y.o. male who is being seen and evaluated for multiple medical problems.  Patient presents for admission to nursing facility.    HPI:  HPI  Patient is a 38-year-old male admitted to nursing facility following hospitalization.  Patient had presented to ED with complaints of chest pain.  Workup did reveal findings for DKA.  Patient's chest pain was worked up as well that revealed negative troponin.  Patient was followed by endocrinology for DKA and patient was transitioned to sliding scale insulin with adjustments in Lantus.  Patient was also noted to have FATOUMATA which was likely due to dehydration and was given IV fluids with improvement.  PT OT evaluated patient recommending SNF for further medical management and therapy.  Patient was seen and examined and appears to be in no acute distress.  Patient denies polydipsia polyuria polyphagia.  Past Medical History:   Diagnosis Date   • Diabetes mellitus (Multi)    • DKA (diabetic ketoacidosis) (Multi)    • Hyperkalemia    • Hyperlipidemia    • Hypertension    • Hyponatremia        Past Surgical History:   Procedure Laterality Date   • OTHER SURGICAL HISTORY  2022    No history of surgery       Family History   Problem Relation Name Age of Onset   • No Known Problems Mother     • No Known Problems Father     • Hypertension Sister     • Diabetes Maternal Grandmother     • Hypertension Maternal Grandmother     • Cancer Other         Social History     Socioeconomic History   • Marital status: Single   Tobacco Use   • Smoking status: Never   • Smokeless tobacco: Never   Substance and Sexual Activity   • Alcohol use: Not Currently   • Drug use: Never     Social Determinants of Health     Financial Resource Strain: Low Risk  (2024)    Overall Financial Resource Strain (CARDIA)    • Difficulty of Paying Living Expenses: Not  hard at all   Food Insecurity: No Food Insecurity (7/26/2024)    Received from [facility]    Hunger Vital Sign    • Worried About Running Out of Food in the Last Year: Never true    • Ran Out of Food in the Last Year: Never true   Transportation Needs: No Transportation Needs (7/2/2024)    Received from [facility]    PRAPARE - Transportation    • Lack of Transportation (Medical): No    • Lack of Transportation (Non-Medical): No   Housing Stability: Low Risk  (7/2/2024)    Received from [facility]    Housing Stability Vital Sign    • Unable to Pay for Housing in the Last Year: No    • Number of Places Lived in the Last Year: 1    • In the last 12 months, was there a time when you did not have a steady place to sleep or slept in a shelter (including now)?: No       Vital signs: 140/62, 97.3, 66, 16, 97%, blood sugar 400    Objective  Physical Exam  Constitutional:       General: He is not in acute distress.  Eyes:      Extraocular Movements: Extraocular movements intact.   Cardiovascular:      Rate and Rhythm: Normal rate and regular rhythm.   Pulmonary:      Effort: Pulmonary effort is normal.      Breath sounds: Normal breath sounds.   Abdominal:      General: Bowel sounds are normal.      Palpations: Abdomen is soft.   Musculoskeletal:      Cervical back: Neck supple.      Right lower leg: No edema.      Left lower leg: No edema.   Neurological:      Mental Status: He is alert.   Psychiatric:         Mood and Affect: Mood normal.         Behavior: Behavior is cooperative.         Assessment/Plan  Problem List Items Addressed This Visit       Diabetes (Multi)     Glucoscan-sliding scale insulin  Insulin glargine  Consistent carb\overall diabetic diet         Hypertension     Monitor blood pressure  Amlodipine         FATOUMATA (acute kidney injury) (CMS-HCC)     Improved, received IV fluids  Continue to monitor labs         Diabetic ketoacidosis (Multi) - Primary      Adjustment in diabetic regimen  Endocrine following         Hyperlipidemia     Statin  Monitor labs         Chest pain     No acute findings.  Monitor          Hospital records reviewed  Medications, treatments, and labs reviewed  Continue medications and treatments as listed in EMR  Discussed with nursing and therapy      Scribe Attestation  IRandi Scribe   attest that this documentation has been prepared under the direction and in the presence of Mary Ann Beckwith MD    Provider Attestation - Scribe documentation  All medical record entries made by the Scribe were at my direction and personally dictated by me. I have reviewed the chart and agree that the record accurately reflects my personal performance of the history, physical exam, discussion and plan.   Mary Ann Beckwith MD      Electronically Signed By: Mary Ann Beckwith MD   8/12/24  1:18 PM   n/a

## 2024-08-13 ENCOUNTER — NURSING HOME VISIT (OUTPATIENT)
Dept: POST ACUTE CARE | Facility: EXTERNAL LOCATION | Age: 39
End: 2024-08-13
Payer: COMMERCIAL

## 2024-08-13 DIAGNOSIS — E13.69 OTHER SPECIFIED DIABETES MELLITUS WITH OTHER SPECIFIED COMPLICATION, UNSPECIFIED WHETHER LONG TERM INSULIN USE (MULTI): ICD-10-CM

## 2024-08-13 DIAGNOSIS — N17.9 AKI (ACUTE KIDNEY INJURY) (CMS-HCC): ICD-10-CM

## 2024-08-13 DIAGNOSIS — I10 HYPERTENSION, UNSPECIFIED TYPE: ICD-10-CM

## 2024-08-13 DIAGNOSIS — R53.1 WEAKNESS: Primary | ICD-10-CM

## 2024-08-13 DIAGNOSIS — E11.41 DIABETIC MONONEUROPATHY ASSOCIATED WITH TYPE 2 DIABETES MELLITUS (MULTI): ICD-10-CM

## 2024-08-13 DIAGNOSIS — Z91.199 NON-COMPLIANCE: ICD-10-CM

## 2024-08-13 DIAGNOSIS — E13.10 DIABETIC KETOACIDOSIS WITHOUT COMA ASSOCIATED WITH OTHER SPECIFIED DIABETES MELLITUS (MULTI): ICD-10-CM

## 2024-08-13 DIAGNOSIS — E78.5 HYPERLIPIDEMIA, UNSPECIFIED HYPERLIPIDEMIA TYPE: ICD-10-CM

## 2024-08-13 PROCEDURE — 99309 SBSQ NF CARE MODERATE MDM 30: CPT | Performed by: NURSE PRACTITIONER

## 2024-08-13 NOTE — LETTER
Patient: Armand Hirsch  : 1985    Encounter Date: 2024    PROGRESS NOTE    Subjective  Chief complaint: Armand Hirsch is a 38 y.o. male who is an acute skilled patient being seen and evaluated for weakness    HPI:  HPI Has been evaluated by therapy. Nursing reports that he is resistant towards required care.  He allowed blood sugar check during this visit. Reviewed blood sugars with nursing, insulin adjusted.    Will order Freestyle Dennise system.   He denies any pain or numbness in his feet, remains on gabapentin.   Objective  Vital signs: 140/62-97.5 - 66-16    Physical Exam  Vitals and nursing note reviewed.   Constitutional:       General: He is not in acute distress.  Eyes:      Extraocular Movements: Extraocular movements intact.   Cardiovascular:      Rate and Rhythm: Normal rate and regular rhythm.   Pulmonary:      Effort: Pulmonary effort is normal.      Comments: Lungs clear   Abdominal:      General: Bowel sounds are normal.      Palpations: Abdomen is soft.   Musculoskeletal:      Cervical back: Neck supple.      Right lower leg: No edema.      Left lower leg: No edema.   Neurological:      Mental Status: He is alert.   Psychiatric:         Mood and Affect: Mood normal. Affect is inappropriate.         Behavior: Behavior is cooperative.      Comments: Resistant to care   Limited cognition          Assessment/Plan  Problem List Items Addressed This Visit       Diabetes (Multi)     Glucoscan-sliding scale insulin  Insulin glargine  Consistent carb\overall diabetic diet  BS- elevated >400, insulin adjusted   Freestyle Dennise system          Hypertension     Monitor blood pressure  Amlodipine  Monitor labs          FATOUMATA (acute kidney injury) (CMS-HCC)     Improved, received IV fluids  Continue to monitor labs         Diabetic ketoacidosis (Multi)     BG reviewed  Insulin adjusted  Order Freestyle dennise         Diabetic mononeuropathy associated with type 2 diabetes mellitus (Multi)     Remains   on gabapentin   Denies any numbness in feet            Hyperlipidemia     Statin  Monitor labs         Non-compliance     Resistant to care   Reapproach   Monitor behaviors          Weakness - Primary     Monitor progress in therapy          Medications, treatments, and labs reviewed  Continue medications and treatments as listed in EMR      Scribe Attestation  IKeo Scribe   attest that this documentation has been prepared under the direction and in the presence of KEVIN Dykes    Provider Attestation - Scribe documentation  All medical record entries made by the Scribe were at my direction and personally dictated by me. I have reviewed the chart and agree that the record accurately reflects my personal performance of the history, physical exam, discussion and plan.   KEVIN Dykes        Electronically Signed By: KEVIN Dykes   8/16/24 11:07 PM

## 2024-08-14 ENCOUNTER — NURSING HOME VISIT (OUTPATIENT)
Dept: POST ACUTE CARE | Facility: EXTERNAL LOCATION | Age: 39
End: 2024-08-14
Payer: COMMERCIAL

## 2024-08-14 DIAGNOSIS — R53.1 WEAKNESS: ICD-10-CM

## 2024-08-14 DIAGNOSIS — I10 HYPERTENSION, UNSPECIFIED TYPE: ICD-10-CM

## 2024-08-14 DIAGNOSIS — E13.69 OTHER SPECIFIED DIABETES MELLITUS WITH OTHER SPECIFIED COMPLICATION, UNSPECIFIED WHETHER LONG TERM INSULIN USE (MULTI): Primary | ICD-10-CM

## 2024-08-14 PROCEDURE — 99309 SBSQ NF CARE MODERATE MDM 30: CPT | Performed by: INTERNAL MEDICINE

## 2024-08-14 NOTE — LETTER
Patient: Armand Hirsch  : 1985    Encounter Date: 2024    PROGRESS NOTE    Subjective  Chief complaint: Armand Hirsch is a 38 y.o. male who is an acute skilled patient being seen and evaluated for weakness    HPI:  HPI  Patient is currently working in therapy due to generalized weakness.  Patient is able to ambulate with no assistive device for distance of 120 feet x 2 with CGA.  Patient is able to navigate up and down 8 steps with use of 1 handrail and CGA.  Patient is reportedly has good dynamic balance and static balance.  Patient seen and examined at the bedside, appears to be in no acute distress.    Objective  Vital signs: 140/62, 97.3, 66, 16, 97%, blood sugar 390    Physical Exam  Constitutional:       General: He is not in acute distress.  Eyes:      Extraocular Movements: Extraocular movements intact.   Cardiovascular:      Rate and Rhythm: Normal rate and regular rhythm.   Pulmonary:      Effort: Pulmonary effort is normal.      Breath sounds: Normal breath sounds.   Abdominal:      General: Bowel sounds are normal.      Palpations: Abdomen is soft.   Musculoskeletal:      Cervical back: Neck supple.      Right lower leg: No edema.      Left lower leg: No edema.   Neurological:      Mental Status: He is alert.   Psychiatric:         Mood and Affect: Mood normal.         Behavior: Behavior is cooperative.         Assessment/Plan  Problem List Items Addressed This Visit       Diabetes (Multi) - Primary     Glucoscan-sliding scale insulin  Insulin glargine  Consistent carb\overall diabetic diet         Hypertension     Monitor blood pressure  Amlodipine         Weakness     Actively participating, continue          Medications, treatments, and labs reviewed  Continue medications and treatments as listed in EMR      Scribe Attestation  I, Kei Buckner   attest that this documentation has been prepared under the direction and in the presence of Mary Ann Beckwith MD    Provider  Attestation - Scribe documentation  All medical record entries made by the Scribe were at my direction and personally dictated by me. I have reviewed the chart and agree that the record accurately reflects my personal performance of the history, physical exam, discussion and plan.   Mary Ann Beckwith MD        Electronically Signed By: Mary Ann Beckwith MD   8/14/24  3:23 PM

## 2024-08-14 NOTE — PROGRESS NOTES
PROGRESS NOTE    Subjective   Chief complaint: Armand Hirsch is a 38 y.o. male who is an acute skilled patient being seen and evaluated for weakness    HPI:  HPI Has been evaluated by therapy. Nursing reports that he is resistant towards required care.  He allowed blood sugar check during this visit. Reviewed blood sugars with nursing, insulin adjusted.    Will order Freestyle Dennise system.   He denies any pain or numbness in his feet, remains on gabapentin.   Objective   Vital signs: 140/62-97.5 - 66-16    Physical Exam  Vitals and nursing note reviewed.   Constitutional:       General: He is not in acute distress.  Eyes:      Extraocular Movements: Extraocular movements intact.   Cardiovascular:      Rate and Rhythm: Normal rate and regular rhythm.   Pulmonary:      Effort: Pulmonary effort is normal.      Comments: Lungs clear   Abdominal:      General: Bowel sounds are normal.      Palpations: Abdomen is soft.   Musculoskeletal:      Cervical back: Neck supple.      Right lower leg: No edema.      Left lower leg: No edema.   Neurological:      Mental Status: He is alert.   Psychiatric:         Mood and Affect: Mood normal. Affect is inappropriate.         Behavior: Behavior is cooperative.      Comments: Resistant to care   Limited cognition          Assessment/Plan   Problem List Items Addressed This Visit       Diabetes (Multi)     Glucoscan-sliding scale insulin  Insulin glargine  Consistent carb\overall diabetic diet  BS- elevated >400, insulin adjusted   Freestyle Dennise system          Hypertension     Monitor blood pressure  Amlodipine  Monitor labs          FATOUMATA (acute kidney injury) (CMS-HCC)     Improved, received IV fluids  Continue to monitor labs         Diabetic ketoacidosis (Multi)     BG reviewed  Insulin adjusted  Order Freestyle dennise         Diabetic mononeuropathy associated with type 2 diabetes mellitus (Multi)     Remains  on gabapentin   Denies any numbness in feet            Hyperlipidemia      Statin  Monitor labs         Non-compliance     Resistant to care   Reapproach   Monitor behaviors          Weakness - Primary     Monitor progress in therapy          Medications, treatments, and labs reviewed  Continue medications and treatments as listed in EMR      Scribe Attestation  I, Kei Durham   attest that this documentation has been prepared under the direction and in the presence of KEVIN Dykes    Provider Attestation - Scribe documentation  All medical record entries made by the Scribe were at my direction and personally dictated by me. I have reviewed the chart and agree that the record accurately reflects my personal performance of the history, physical exam, discussion and plan.   KEVIN Dykes

## 2024-08-14 NOTE — PROGRESS NOTES
PROGRESS NOTE    Subjective   Chief complaint: Armand Hirsch is a 38 y.o. male who is an acute skilled patient being seen and evaluated for weakness    HPI:  HPI  Patient is currently working in therapy due to generalized weakness.  Patient is able to ambulate with no assistive device for distance of 120 feet x 2 with CGA.  Patient is able to navigate up and down 8 steps with use of 1 handrail and CGA.  Patient is reportedly has good dynamic balance and static balance.  Patient seen and examined at the bedside, appears to be in no acute distress.    Objective   Vital signs: 140/62, 97.3, 66, 16, 97%, blood sugar 390    Physical Exam  Constitutional:       General: He is not in acute distress.  Eyes:      Extraocular Movements: Extraocular movements intact.   Cardiovascular:      Rate and Rhythm: Normal rate and regular rhythm.   Pulmonary:      Effort: Pulmonary effort is normal.      Breath sounds: Normal breath sounds.   Abdominal:      General: Bowel sounds are normal.      Palpations: Abdomen is soft.   Musculoskeletal:      Cervical back: Neck supple.      Right lower leg: No edema.      Left lower leg: No edema.   Neurological:      Mental Status: He is alert.   Psychiatric:         Mood and Affect: Mood normal.         Behavior: Behavior is cooperative.         Assessment/Plan   Problem List Items Addressed This Visit       Diabetes (Multi) - Primary     Glucoscan-sliding scale insulin  Insulin glargine  Consistent carb\overall diabetic diet         Hypertension     Monitor blood pressure  Amlodipine         Weakness     Actively participating, continue          Medications, treatments, and labs reviewed  Continue medications and treatments as listed in EMR      Scribe Attestation  I, Kei Buckner   attest that this documentation has been prepared under the direction and in the presence of Mary Ann Beckwith MD    Provider Attestation - Scribe documentation  All medical record entries made by the  Scribe were at my direction and personally dictated by me. I have reviewed the chart and agree that the record accurately reflects my personal performance of the history, physical exam, discussion and plan.   Mary Ann Beckwith MD

## 2024-08-15 ENCOUNTER — NURSING HOME VISIT (OUTPATIENT)
Dept: POST ACUTE CARE | Facility: EXTERNAL LOCATION | Age: 39
End: 2024-08-15
Payer: COMMERCIAL

## 2024-08-15 DIAGNOSIS — R53.1 WEAKNESS: Primary | ICD-10-CM

## 2024-08-15 DIAGNOSIS — E11.41 DIABETIC MONONEUROPATHY ASSOCIATED WITH TYPE 2 DIABETES MELLITUS (MULTI): ICD-10-CM

## 2024-08-15 DIAGNOSIS — Z91.199 NON-COMPLIANCE: ICD-10-CM

## 2024-08-15 DIAGNOSIS — I10 HYPERTENSION, UNSPECIFIED TYPE: ICD-10-CM

## 2024-08-15 DIAGNOSIS — E13.69 OTHER SPECIFIED DIABETES MELLITUS WITH OTHER SPECIFIED COMPLICATION, UNSPECIFIED WHETHER LONG TERM INSULIN USE (MULTI): ICD-10-CM

## 2024-08-15 PROCEDURE — 99309 SBSQ NF CARE MODERATE MDM 30: CPT | Performed by: NURSE PRACTITIONER

## 2024-08-15 NOTE — LETTER
Patient: Armand Hirsch  : 1985    Encounter Date: 08/15/2024    PROGRESS NOTE    Subjective  Chief complaint: Armand Hirsch is a 38 y.o. male who is an acute skilled patient being seen and evaluated for weakness    HPI:  HPI Remains in therapy. Reviewed blood sugars with nurse, insulin adjusted. Nursing reports that he has been cooperative with all care. Freestyle pao system has been ordered.   He denies any chest pain or tightness. Is scheduled for therapy session this afternoon. ordered.    Objective  Vital signs: 134/62-97.5 - 68-18    Physical Exam  Constitutional:       General: He is not in acute distress.  Eyes:      Extraocular Movements: Extraocular movements intact.   Cardiovascular:      Rate and Rhythm: Normal rate and regular rhythm.   Pulmonary:      Effort: Pulmonary effort is normal.      Breath sounds: Normal breath sounds.      Comments: Lungs clear  Abdominal:      General: Bowel sounds are normal.      Palpations: Abdomen is soft.   Musculoskeletal:      Cervical back: Neck supple.      Right lower leg: No edema.      Left lower leg: No edema.   Neurological:      Mental Status: He is alert.   Psychiatric:         Mood and Affect: Mood normal.         Behavior: Behavior is cooperative.         Assessment/Plan  Problem List Items Addressed This Visit       Diabetes (Multi)     Glucoscan-sliding scale insulin  Insulin glargine, adjusted for better glycemic management   Consistent carb\overall diabetic diet  Freestyle pao system ordered          Hypertension     Monitor blood pressure  Amlodipine         Diabetic mononeuropathy associated with type 2 diabetes mellitus (Multi)     Remains  on gabapentin   Denies any numbness in feet            Non-compliance     Nursing reports that he has been more cooperative with care          Weakness - Primary     Actively participating, continue          Medications, treatments, and labs reviewed  Continue medications and treatments as listed in  EMR      Scribe Attestation  I, Kei Durham   attest that this documentation has been prepared under the direction and in the presence of KEVIN Dykes    Provider Attestation - Scribe documentation  All medical record entries made by the Scribe were at my direction and personally dictated by me. I have reviewed the chart and agree that the record accurately reflects my personal performance of the history, physical exam, discussion and plan.   KEIVN Dkyes          Electronically Signed By: KEVIN Dykes   8/17/24  2:05 PM

## 2024-08-16 PROBLEM — E10.9 TYPE 1 DIABETES MELLITUS WITHOUT COMPLICATIONS (MULTI): Chronic | Status: RESOLVED | Noted: 2019-09-04 | Resolved: 2024-08-16

## 2024-08-16 PROBLEM — R73.9 HYPERGLYCEMIA: Status: RESOLVED | Noted: 2021-11-14 | Resolved: 2024-08-16

## 2024-08-16 PROBLEM — E10.65 POORLY CONTROLLED TYPE 1 DIABETES MELLITUS (MULTI): Status: RESOLVED | Noted: 2024-02-07 | Resolved: 2024-08-16

## 2024-08-16 NOTE — ASSESSMENT & PLAN NOTE
Glucoscan-sliding scale insulin  Insulin glargine, adjusted for better glycemic management   Consistent carb\overall diabetic diet  FreestMashed jobse system ordered

## 2024-08-16 NOTE — PROGRESS NOTES
PROGRESS NOTE    Subjective   Chief complaint: Armand Hirsch is a 38 y.o. male who is an acute skilled patient being seen and evaluated for weakness    HPI:  HPI Remains in therapy. Reviewed blood sugars with nurse, insulin adjusted. Nursing reports that he has been cooperative with all care. Freestyle pao system has been ordered.   He denies any chest pain or tightness. Is scheduled for therapy session this afternoon. ordered.    Objective   Vital signs: 134/62-97.5 - 68-18    Physical Exam  Constitutional:       General: He is not in acute distress.  Eyes:      Extraocular Movements: Extraocular movements intact.   Cardiovascular:      Rate and Rhythm: Normal rate and regular rhythm.   Pulmonary:      Effort: Pulmonary effort is normal.      Breath sounds: Normal breath sounds.      Comments: Lungs clear  Abdominal:      General: Bowel sounds are normal.      Palpations: Abdomen is soft.   Musculoskeletal:      Cervical back: Neck supple.      Right lower leg: No edema.      Left lower leg: No edema.   Neurological:      Mental Status: He is alert.   Psychiatric:         Mood and Affect: Mood normal.         Behavior: Behavior is cooperative.         Assessment/Plan   Problem List Items Addressed This Visit       Diabetes (Multi)     Glucoscan-sliding scale insulin  Insulin glargine, adjusted for better glycemic management   Consistent carb\overall diabetic diet  Freestyle pao system ordered          Hypertension     Monitor blood pressure  Amlodipine         Diabetic mononeuropathy associated with type 2 diabetes mellitus (Multi)     Remains  on gabapentin   Denies any numbness in feet            Non-compliance     Nursing reports that he has been more cooperative with care          Weakness - Primary     Actively participating, continue          Medications, treatments, and labs reviewed  Continue medications and treatments as listed in EMR      Scribe Attestation  I, Kei Durham   attest that  this documentation has been prepared under the direction and in the presence of KEVIN Dykes    Provider Attestation - Scribe documentation  All medical record entries made by the Scribe were at my direction and personally dictated by me. I have reviewed the chart and agree that the record accurately reflects my personal performance of the history, physical exam, discussion and plan.   KEVIN Dykes

## 2024-08-17 NOTE — ASSESSMENT & PLAN NOTE
Glucoscan-sliding scale insulin  Insulin glargine  Consistent carb\overall diabetic diet  BS- elevated >400, insulin adjusted   Freestyle Dennise system

## 2024-08-19 ENCOUNTER — NURSING HOME VISIT (OUTPATIENT)
Dept: POST ACUTE CARE | Facility: EXTERNAL LOCATION | Age: 39
End: 2024-08-19
Payer: COMMERCIAL

## 2024-08-19 DIAGNOSIS — R53.1 WEAKNESS: ICD-10-CM

## 2024-08-19 DIAGNOSIS — D64.9 ANEMIA, UNSPECIFIED TYPE: ICD-10-CM

## 2024-08-19 DIAGNOSIS — E13.69 OTHER SPECIFIED DIABETES MELLITUS WITH OTHER SPECIFIED COMPLICATION, UNSPECIFIED WHETHER LONG TERM INSULIN USE (MULTI): ICD-10-CM

## 2024-08-19 DIAGNOSIS — I10 HYPERTENSION, UNSPECIFIED TYPE: Primary | ICD-10-CM

## 2024-08-19 PROCEDURE — 99308 SBSQ NF CARE LOW MDM 20: CPT | Performed by: INTERNAL MEDICINE

## 2024-08-19 NOTE — PROGRESS NOTES
PROGRESS NOTE    Subjective   Chief complaint: Armand Hirsch is a 38 y.o. male who is an acute skilled patient being seen and evaluated for weakness    HPI:  HPI  Patient is currently working in therapy, progressing towards goals.  Patient is ambulating without assistive device up to 250 fee with CGA to mod independence.  Patient is reported to have good balance.  Therapy is working with patient on completing safe transfers and is completing with moderate independence.  Speech therapy working patient to address cognition.  Patient seen and examined at the bedside, appears to be in no acute distress.  Patient is denying constitutional symptoms.  Patient reporting no new concerns    Objective   Vital signs: 140/62, 97.3, 66, 16, 97% blood sugar 147    Physical Exam  Constitutional:       General: He is not in acute distress.  Eyes:      Extraocular Movements: Extraocular movements intact.   Cardiovascular:      Rate and Rhythm: Normal rate and regular rhythm.   Pulmonary:      Effort: Pulmonary effort is normal.      Breath sounds: Normal breath sounds.      Comments: Lungs clear  Abdominal:      General: Bowel sounds are normal.      Palpations: Abdomen is soft.   Musculoskeletal:      Cervical back: Neck supple.      Right lower leg: No edema.      Left lower leg: No edema.   Neurological:      Mental Status: He is alert.      Motor: Weakness present.   Psychiatric:         Mood and Affect: Mood normal.         Behavior: Behavior is cooperative.         Assessment/Plan   Problem List Items Addressed This Visit       Anemia     Monitor labs         Diabetes (Multi)     Glucoscan-sliding scale insulin  Insulin glargine  Consistent carb\overall diabetic diet  Freestyle pao         Hypertension - Primary     Monitor blood pressure  Amlodipine         Weakness     Continue working towards goals, improving          Medications, treatments, and labs reviewed  Continue medications and treatments as listed in  EMR      Scribe Attestation  I, Kei Buckner   attest that this documentation has been prepared under the direction and in the presence of Mary Ann Beckwith MD    Provider Attestation - Scribe documentation  All medical record entries made by the Scribe were at my direction and personally dictated by me. I have reviewed the chart and agree that the record accurately reflects my personal performance of the history, physical exam, discussion and plan.   Mary Ann Beckwith MD

## 2024-08-19 NOTE — ASSESSMENT & PLAN NOTE
Glucoscan-sliding scale insulin  Insulin glargine  Consistent carb\overall diabetic diet  Freestyle pao

## 2024-08-19 NOTE — LETTER
Patient: Armand Hirsch  : 1985    Encounter Date: 2024    PROGRESS NOTE    Subjective  Chief complaint: Armand Hirsch is a 38 y.o. male who is an acute skilled patient being seen and evaluated for weakness    HPI:  HPI  Patient is currently working in therapy, progressing towards goals.  Patient is ambulating without assistive device up to 250 fee with CGA to mod independence.  Patient is reported to have good balance.  Therapy is working with patient on completing safe transfers and is completing with moderate independence.  Speech therapy working patient to address cognition.  Patient seen and examined at the bedside, appears to be in no acute distress.  Patient is denying constitutional symptoms.  Patient reporting no new concerns    Objective  Vital signs: 140/62, 97.3, 66, 16, 97% blood sugar 147    Physical Exam  Constitutional:       General: He is not in acute distress.  Eyes:      Extraocular Movements: Extraocular movements intact.   Cardiovascular:      Rate and Rhythm: Normal rate and regular rhythm.   Pulmonary:      Effort: Pulmonary effort is normal.      Breath sounds: Normal breath sounds.      Comments: Lungs clear  Abdominal:      General: Bowel sounds are normal.      Palpations: Abdomen is soft.   Musculoskeletal:      Cervical back: Neck supple.      Right lower leg: No edema.      Left lower leg: No edema.   Neurological:      Mental Status: He is alert.      Motor: Weakness present.   Psychiatric:         Mood and Affect: Mood normal.         Behavior: Behavior is cooperative.         Assessment/Plan  Problem List Items Addressed This Visit       Anemia     Monitor labs         Diabetes (Multi)     Glucoscan-sliding scale insulin  Insulin glargine  Consistent carb\overall diabetic diet  Freestyle pao         Hypertension - Primary     Monitor blood pressure  Amlodipine         Weakness     Continue working towards goals, improving          Medications, treatments, and labs  reviewed  Continue medications and treatments as listed in EMR      Scribe Attestation  I, Kei Buckner   attest that this documentation has been prepared under the direction and in the presence of Mary Ann Beckwith MD    Provider Attestation - Scribe documentation  All medical record entries made by the Scribe were at my direction and personally dictated by me. I have reviewed the chart and agree that the record accurately reflects my personal performance of the history, physical exam, discussion and plan.   Mary Ann Beckwith MD        Electronically Signed By: Mary Ann Beckwith MD   8/19/24  1:13 PM

## 2024-08-20 ENCOUNTER — NURSING HOME VISIT (OUTPATIENT)
Dept: POST ACUTE CARE | Facility: EXTERNAL LOCATION | Age: 39
End: 2024-08-20
Payer: COMMERCIAL

## 2024-08-20 DIAGNOSIS — R53.1 WEAKNESS: Primary | ICD-10-CM

## 2024-08-20 DIAGNOSIS — E13.69 OTHER SPECIFIED DIABETES MELLITUS WITH OTHER SPECIFIED COMPLICATION, UNSPECIFIED WHETHER LONG TERM INSULIN USE (MULTI): ICD-10-CM

## 2024-08-20 DIAGNOSIS — E78.5 HYPERLIPIDEMIA, UNSPECIFIED HYPERLIPIDEMIA TYPE: ICD-10-CM

## 2024-08-20 DIAGNOSIS — I10 HYPERTENSION, UNSPECIFIED TYPE: ICD-10-CM

## 2024-08-20 DIAGNOSIS — E11.41 DIABETIC MONONEUROPATHY ASSOCIATED WITH TYPE 2 DIABETES MELLITUS (MULTI): ICD-10-CM

## 2024-08-20 PROCEDURE — 99309 SBSQ NF CARE MODERATE MDM 30: CPT | Performed by: NURSE PRACTITIONER

## 2024-08-20 NOTE — LETTER
Patient: Armand Hirsch  : 1985    Encounter Date: 2024    PROGRESS NOTE    Subjective  Chief complaint: Armand Hirsch is a 38 y.o. male who is an acute skilled patient being seen and evaluated for weakness    HPI:  HPI Patient remains in therapy, working  towards goals  Therapy reports making good progress.    Reviewed blood sugars. Remains hyperglycemic - insulin adjusted. Continues to require sliding scale.    He reports good appetite.  Denies any discomfort. Denies any chest pain or tightness. Nursing reports that he snacks frequently throughout the day.    Denies constitutional symptoms.      Objective  Vital signs: 140/62 - 97.2-66-16    Physical Exam  Constitutional:       General: He is not in acute distress.  Eyes:      Extraocular Movements: Extraocular movements intact.   Cardiovascular:      Rate and Rhythm: Normal rate and regular rhythm.   Pulmonary:      Effort: Pulmonary effort is normal.      Breath sounds: Normal breath sounds.      Comments: Lungs clear  Abdominal:      General: Bowel sounds are normal.      Palpations: Abdomen is soft.   Musculoskeletal:      Cervical back: Neck supple.      Right lower leg: No edema.      Left lower leg: No edema.   Neurological:      Mental Status: He is alert.      Motor: Weakness present.   Psychiatric:         Mood and Affect: Mood normal.         Behavior: Behavior is cooperative.         Assessment/Plan  Problem List Items Addressed This Visit       Diabetes (Multi)     Glucoscan-sliding scale insulin  Insulin glargine  Consistent carb\overall diabetic diet  Freestyle pao         Hypertension     Monitor blood pressure  Amlodipine         Diabetic mononeuropathy associated with type 2 diabetes mellitus (Multi)     Remains  on gabapentin   Denies any numbness in feet            Hyperlipidemia     Statin  Monitor labs         Weakness - Primary     Remains in therapy, good progress          Medications, treatments, and labs  reviewed  Continue medications and treatments as listed in EMR      Scribe Attestation  IKeo Scribe   attest that this documentation has been prepared under the direction and in the presence of KEVIN Dykes    Provider Attestation - Scribe documentation  All medical record entries made by the Scribe were at my direction and personally dictated by me. I have reviewed the chart and agree that the record accurately reflects my personal performance of the history, physical exam, discussion and plan.   KEVIN Dykes        Electronically Signed By: KEVIN Dykes   8/23/24  8:02 PM

## 2024-08-21 ENCOUNTER — NURSING HOME VISIT (OUTPATIENT)
Dept: POST ACUTE CARE | Facility: EXTERNAL LOCATION | Age: 39
End: 2024-08-21
Payer: COMMERCIAL

## 2024-08-21 DIAGNOSIS — D64.9 ANEMIA, UNSPECIFIED TYPE: ICD-10-CM

## 2024-08-21 DIAGNOSIS — R53.1 WEAKNESS: ICD-10-CM

## 2024-08-21 DIAGNOSIS — E78.5 HYPERLIPIDEMIA, UNSPECIFIED HYPERLIPIDEMIA TYPE: ICD-10-CM

## 2024-08-21 DIAGNOSIS — I10 HYPERTENSION, UNSPECIFIED TYPE: ICD-10-CM

## 2024-08-21 DIAGNOSIS — E13.69 OTHER SPECIFIED DIABETES MELLITUS WITH OTHER SPECIFIED COMPLICATION, UNSPECIFIED WHETHER LONG TERM INSULIN USE (MULTI): Primary | ICD-10-CM

## 2024-08-21 PROCEDURE — 99315 NF DSCHRG MGMT 30 MIN/LESS: CPT | Performed by: INTERNAL MEDICINE

## 2024-08-21 NOTE — LETTER
Patient: Armand Hirsch  : 1985    Encounter Date: 2024    PROGRESS NOTE    Subjective  Chief complaint: Armand Hirsch is a 38 y.o. male who is an acute skilled patient being seen and evaluated for weakness    HPI:  HPI  Patient has been in skilled nursing facility following hospitalization.  Patient has been making progress in therapy, working towards goals.  Patient is discharging today.  Patient was seen and examined at the bedside, appears to be in no acute distress.  Patient denies constitutional symptoms.  Patient has no further questions or concerns at this time.  Nursing reporting no new concerns.    Objective  Vital signs: 140/62, 97.3, 66, 16, 97% blood sugar 135    Physical Exam  Constitutional:       General: He is not in acute distress.  Eyes:      Extraocular Movements: Extraocular movements intact.   Cardiovascular:      Rate and Rhythm: Normal rate and regular rhythm.   Pulmonary:      Effort: Pulmonary effort is normal.      Breath sounds: Normal breath sounds.      Comments: Lungs clear  Abdominal:      General: Bowel sounds are normal.      Palpations: Abdomen is soft.   Musculoskeletal:      Cervical back: Neck supple.      Right lower leg: No edema.      Left lower leg: No edema.   Neurological:      Mental Status: He is alert.      Motor: Weakness present.   Psychiatric:         Mood and Affect: Mood normal.         Behavior: Behavior is cooperative.       ADMITTING/DISCHARGE DIAGNOSES:  Assessment/Plan  Problem List Items Addressed This Visit       Anemia     Monitor labs  Currently stable         Diabetes (Multi) - Primary     Glucoscan-sliding scale insulin  Insulin glargine  Consistent carb\overall diabetic diet  Freestyle pao         Hypertension     Monitor blood pressure  Amlodipine         Hyperlipidemia     Statin  Monitor labs         Weakness     Completed therapy, discharging        Prognosis - fair  Course - therapy  Plan - DC home with C   Follow up with PCP  within 2 weeks  Medications called into pharmacy by office  Condition at discharge is stable  Medications, treatments, and labs reviewed  Continue medications and treatments as listed in EMR      Scribe Attestation  I, Kei Buckner   attest that this documentation has been prepared under the direction and in the presence of Mary Ann Beckwith MD    Provider Attestation - Scribe documentation  All medical record entries made by the Scribe were at my direction and personally dictated by me. I have reviewed the chart and agree that the record accurately reflects my personal performance of the history, physical exam, discussion and plan.   Mary Ann Beckwith MD        Electronically Signed By: Mary Ann Beckwith MD   8/21/24  2:42 PM

## 2024-08-21 NOTE — PROGRESS NOTES
PROGRESS NOTE    Subjective   Chief complaint: Armand Hirsch is a 38 y.o. male who is an acute skilled patient being seen and evaluated for weakness    HPI:  HPI  Patient has been in skilled nursing facility following hospitalization.  Patient has been making progress in therapy, working towards goals.  Patient is discharging today.  Patient was seen and examined at the bedside, appears to be in no acute distress.  Patient denies constitutional symptoms.  Patient has no further questions or concerns at this time.  Nursing reporting no new concerns.    Objective   Vital signs: 140/62, 97.3, 66, 16, 97% blood sugar 135    Physical Exam  Constitutional:       General: He is not in acute distress.  Eyes:      Extraocular Movements: Extraocular movements intact.   Cardiovascular:      Rate and Rhythm: Normal rate and regular rhythm.   Pulmonary:      Effort: Pulmonary effort is normal.      Breath sounds: Normal breath sounds.      Comments: Lungs clear  Abdominal:      General: Bowel sounds are normal.      Palpations: Abdomen is soft.   Musculoskeletal:      Cervical back: Neck supple.      Right lower leg: No edema.      Left lower leg: No edema.   Neurological:      Mental Status: He is alert.      Motor: Weakness present.   Psychiatric:         Mood and Affect: Mood normal.         Behavior: Behavior is cooperative.       ADMITTING/DISCHARGE DIAGNOSES:  Assessment/Plan   Problem List Items Addressed This Visit       Anemia     Monitor labs  Currently stable         Diabetes (Multi) - Primary     Glucoscan-sliding scale insulin  Insulin glargine  Consistent carb\overall diabetic diet  Freestyle pao         Hypertension     Monitor blood pressure  Amlodipine         Hyperlipidemia     Statin  Monitor labs         Weakness     Completed therapy, discharging        Prognosis - fair  Course - therapy  Plan - DC home with C   Follow up with PCP within 2 weeks  Medications called into pharmacy by office  Condition at  discharge is stable  Medications, treatments, and labs reviewed  Continue medications and treatments as listed in EMR      Scribe Attestation  I, Kei Buckner   attest that this documentation has been prepared under the direction and in the presence of Mary Ann Beckwith MD    Provider Attestation - Scribe documentation  All medical record entries made by the Scribe were at my direction and personally dictated by me. I have reviewed the chart and agree that the record accurately reflects my personal performance of the history, physical exam, discussion and plan.   Mary Ann Beckwith MD

## 2024-08-22 NOTE — PROGRESS NOTES
PROGRESS NOTE    Subjective   Chief complaint: Armand Hirsch is a 38 y.o. male who is an acute skilled patient being seen and evaluated for weakness    HPI:  HPI Patient remains in therapy, working  towards goals  Therapy reports making good progress.    Reviewed blood sugars. Remains hyperglycemic - insulin adjusted. Continues to require sliding scale.    He reports good appetite.  Denies any discomfort. Denies any chest pain or tightness. Nursing reports that he snacks frequently throughout the day.    Denies constitutional symptoms.      Objective   Vital signs: 140/62 - 97.2-66-16    Physical Exam  Constitutional:       General: He is not in acute distress.  Eyes:      Extraocular Movements: Extraocular movements intact.   Cardiovascular:      Rate and Rhythm: Normal rate and regular rhythm.   Pulmonary:      Effort: Pulmonary effort is normal.      Breath sounds: Normal breath sounds.      Comments: Lungs clear  Abdominal:      General: Bowel sounds are normal.      Palpations: Abdomen is soft.   Musculoskeletal:      Cervical back: Neck supple.      Right lower leg: No edema.      Left lower leg: No edema.   Neurological:      Mental Status: He is alert.      Motor: Weakness present.   Psychiatric:         Mood and Affect: Mood normal.         Behavior: Behavior is cooperative.         Assessment/Plan   Problem List Items Addressed This Visit       Diabetes (Multi)     Glucoscan-sliding scale insulin  Insulin glargine  Consistent carb\overall diabetic diet  Freestyle pao         Hypertension     Monitor blood pressure  Amlodipine         Diabetic mononeuropathy associated with type 2 diabetes mellitus (Multi)     Remains  on gabapentin   Denies any numbness in feet            Hyperlipidemia     Statin  Monitor labs         Weakness - Primary     Remains in therapy, good progress          Medications, treatments, and labs reviewed  Continue medications and treatments as listed in EMR      Scribe  Attestation  I, Kei Durham   attest that this documentation has been prepared under the direction and in the presence of KEVIN Dykes    Provider Attestation - Scribe documentation  All medical record entries made by the Magaliibe were at my direction and personally dictated by me. I have reviewed the chart and agree that the record accurately reflects my personal performance of the history, physical exam, discussion and plan.   KEVIN Dykes

## 2024-08-29 ENCOUNTER — APPOINTMENT (OUTPATIENT)
Dept: PRIMARY CARE | Facility: CLINIC | Age: 39
End: 2024-08-29
Payer: COMMERCIAL

## 2024-08-29 ENCOUNTER — DOCUMENTATION (OUTPATIENT)
Dept: PRIMARY CARE | Facility: CLINIC | Age: 39
End: 2024-08-29

## 2024-08-29 NOTE — PROGRESS NOTES
Discharge Facility: [facility]  Discharge Diagnosis: DKA, type 1, not at goal (HCC) [E10.10]   Admission Date: 8/26/24  Discharge Date: 8/28/24    PCP Appointment Date: 8/29/24  Specialist Appointment Date: n/a  Hospital Encounter and Summary Linked: Yes    PCP appointment within 2 business days of discharge.

## 2024-10-06 DIAGNOSIS — E78.5 HYPERLIPIDEMIA, UNSPECIFIED HYPERLIPIDEMIA TYPE: ICD-10-CM

## 2024-10-07 RX ORDER — ATORVASTATIN CALCIUM 20 MG/1
20 TABLET, FILM COATED ORAL DAILY
Qty: 90 TABLET | Refills: 3 | Status: SHIPPED | OUTPATIENT
Start: 2024-10-07